# Patient Record
Sex: FEMALE | Race: WHITE | NOT HISPANIC OR LATINO | Employment: FULL TIME
[De-identification: names, ages, dates, MRNs, and addresses within clinical notes are randomized per-mention and may not be internally consistent; named-entity substitution may affect disease eponyms.]

---

## 2017-06-24 ENCOUNTER — HEALTH MAINTENANCE LETTER (OUTPATIENT)
Age: 57
End: 2017-06-24

## 2017-11-09 ENCOUNTER — TRANSFERRED RECORDS (OUTPATIENT)
Dept: HEALTH INFORMATION MANAGEMENT | Facility: CLINIC | Age: 57
End: 2017-11-09

## 2017-12-06 ENCOUNTER — OFFICE VISIT (OUTPATIENT)
Dept: OPHTHALMOLOGY | Facility: CLINIC | Age: 57
End: 2017-12-06
Attending: OPHTHALMOLOGY
Payer: COMMERCIAL

## 2017-12-06 DIAGNOSIS — H25.10 NUCLEAR SENILE CATARACT, UNSPECIFIED LATERALITY: Primary | ICD-10-CM

## 2017-12-06 DIAGNOSIS — H04.123 DRY EYES: ICD-10-CM

## 2017-12-06 DIAGNOSIS — H25.13 AGE-RELATED NUCLEAR CATARACT OF BOTH EYES: ICD-10-CM

## 2017-12-06 PROCEDURE — 76519 ECHO EXAM OF EYE: CPT | Mod: ZF | Performed by: OPHTHALMOLOGY

## 2017-12-06 PROCEDURE — 99215 OFFICE O/P EST HI 40 MIN: CPT | Mod: ZF

## 2017-12-06 PROCEDURE — 92025 CPTRIZED CORNEAL TOPOGRAPHY: CPT | Mod: ZF | Performed by: OPHTHALMOLOGY

## 2017-12-06 ASSESSMENT — REFRACTION_WEARINGRX
OS_CYLINDER: +0.75
OS_SPHERE: -6.25
OD_SPHERE: -6.25
OD_CYLINDER: +1.50
OD_ADD: +1.75
OS_AXIS: 079
SPECS_TYPE: PAL
OD_AXIS: 108
OS_ADD: +1.75

## 2017-12-06 ASSESSMENT — EXTERNAL EXAM - LEFT EYE: OS_EXAM: NORMAL

## 2017-12-06 ASSESSMENT — TONOMETRY
OS_IOP_MMHG: 17
IOP_METHOD: TONOPEN
OD_IOP_MMHG: 17

## 2017-12-06 ASSESSMENT — VISUAL ACUITY
OD_CC: 20/70
METHOD: SNELLEN - LINEAR
CORRECTION_TYPE: GLASSES, CONTACTS
OD_PH_CC: 20/60-1
OD_CC+: +1
OS_CC+: -3
OS_CC: 20/25

## 2017-12-06 ASSESSMENT — EXTERNAL EXAM - RIGHT EYE: OD_EXAM: NORMAL

## 2017-12-06 ASSESSMENT — CUP TO DISC RATIO
OS_RATIO: 0.4
OD_RATIO: 0.4

## 2017-12-06 ASSESSMENT — CONF VISUAL FIELD
OS_NORMAL: 1
OD_NORMAL: 1

## 2017-12-06 NOTE — LETTER
12/6/2017       RE: Emily Mcdermott  [address] [facility] [address]     Dear Colleague,    Thank you for referring your patient, Emily Mcdermott, to the EYE CLINIC at [facility]. Please see a copy of my visit note below.    Emily Mcdermott  is a 57 year old year old female  who presents with visually significant cataract OU affecting activities of daily living.      Myopic both eyes, reports vision is distorted OU. Patient reports trouble with depth perception with current situation, and gets nauseous at times. She is wearing CL in OD and then putting corrective glasses on top of it (using double the prescribed MRx in OD!!)    After a detailed discussion, the patient has agreed to proceed with cataract surgery extraction with intraocular lens implant  right eye    Risks and benefits of cataract surgery were discussed with the patient, including the risk of pain, bleeding, infection, inflammation, elevated eye pressure, the need for additional surgery, loss of vision, loss of the eye, and the possible need to wear glasses postoperatively.      Anesthesia: topical/MAC    Trypan blue: Yes    IFIS solution: No    Malyugin ring: No    Refractive target: emmetropia. Does not want monovision    IOL type: 1 piece    Toric IOL:  Patient wants to think about it    Additional considerations:      No guttata/PXE, not on blood thinner or flomax  7 mm dilation both eyes  No trauma or steroids  No claustrophobia    Will have anisometropia after CATARACT EXTRACTION OD  Discussed option of CL vs CATARACT EXTRACTION OS postop OD    Patient was wearing CL OD today. Repeat topography and IOL calc prior to surgery (after patient has been out of contact lenses for 7-10 days).    RETURN TO CLINIC in 1 month for K tpopgraphy and IOL calc     ~~~~~~~~~~~~~~~~~~~~~~~~~~~~~~~~~~~~~~~~~~~~~~~~~~~~~~~~~~~~~~~~      Again, thank you for allowing me to participate in the care of your  patient.      Sincerely,    Jay Knight MD

## 2017-12-06 NOTE — PROGRESS NOTES
Emily Mcdermott  is a 57 year old year old female  who presents with visually significant cataract OU affecting activities of daily living.      Myopic both eyes, reports vision is distorted OU. Patient reports trouble with depth perception with current situation, and gets nauseous at times. She is wearing CL in OD and then putting corrective glasses on top of it (using double the prescribed MRx in OD!!)    After a detailed discussion, the patient has agreed to proceed with cataract surgery extraction with intraocular lens implant  right eye    Risks and benefits of cataract surgery were discussed with the patient, including the risk of pain, bleeding, infection, inflammation, elevated eye pressure, the need for additional surgery, loss of vision, loss of the eye, and the possible need to wear glasses postoperatively.      Anesthesia: topical/MAC    Trypan blue: Yes    IFIS solution: No    Malyugin ring: No    Refractive target: emmetropia. Does not want monovision    IOL type: 1 piece    Toric IOL:  Patient wants to think about it    Additional considerations:      No guttata/PXE, not on blood thinner or flomax  7 mm dilation both eyes  No trauma or steroids  No claustrophobia    Will have anisometropia after CATARACT EXTRACTION OD  Discussed option of CL vs CATARACT EXTRACTION OS postop OD    Patient was wearing CL OD today. Repeat topography and IOL calc prior to surgery (after patient has been out of contact lenses for 7-10 days).    RETURN TO CLINIC in 1 month for K tpopgraphy and IOL calc     REMI Magallon  Cornea fellow      ~~~~~~~~~~~~~~~~~~~~~~~~~~~~~~~~~~~~~~~~~~~~~~~~~~~~~~~~~~~~~~~~    Complete documentation of historical and exam elements from today's encounter can be found in the full encounter summary report (not reduplicated in this progress note). I personally obtained the chief complaint(s) and history of present illness.  I confirmed and edited as necessary the review of systems, past  medical/surgical history, family history, social history, and examination findings as documented by others; and I examined the patient myself. I personally reviewed the relevant tests, images, and reports as documented above. I formulated and edited as necessary the assessment and plan and discussed the findings and management plan with the patient and family.    I personally viewed the [imaging] and I agree with the interpretation as documented by the resident/fellow and edited by me as appropriate.    Jay Knight MD        I personally spent great than 40min with the patient, of which >50% of the time was spent face to face with the patient, counseling and coordinating care with the patient. We discussed the complexity of her diagnosis, the need for further information prior to proceeding with yet another surgery, and the unknown prognosis for the patient at this time.    Jay Knight MD

## 2017-12-06 NOTE — NURSING NOTE
Chief Complaints and History of Present Illnesses   Patient presents with     Cataract Evaluation     referred by Dr. Sheriff     HPI    Affected eye(s):  Right   Symptoms:     Floaters (Comment: infrequently noted)   No flashes   Glare (Comment: with headlights at night)   Photophobia (Comment: with headlights at night)      Duration:  2 weeks   Frequency:  Constant       Do you have eye pain now?:  No      Comments:  Pt here for cataract eval - referred by Dr. Sheriff at [facility]  Pt states vision is horrible, much worse in the right eye than the left  Pt wears CL in the right eye and glasses    Pt is diabetic but isn't currently checking BS daily  Pt unsure what most recent A1c measurement was    No drops    Nicole Mixon COA 1:10 PM December 6, 2017

## 2017-12-06 NOTE — MR AVS SNAPSHOT
After Visit Summary   12/6/2017    Emily Mcdermott    MRN: [id number]           Patient Information     Date Of Birth          1960        Visit Information        Provider Department      12/6/2017 12:45 PM Jay Knight MD Eye Clinic        Today's Diagnoses     Nuclear senile cataract, unspecified laterality - Both Eyes    -  1    Age-related nuclear cataract of both eyes - Both Eyes        Dry eyes - Both Eyes           Follow-ups after your visit        Your next 10 appointments already scheduled     Jan 17, 2018  9:30 AM CST   [facility]   Eye Clinic ([facility])    [address]  [address]   [address] [address]  [facility] [address]   [phone]            Jan 24, 2018  9:30 AM CST   Post-Op with Jay Knight MD   Eye Clinic ([facility])    [address]  [address]   [address]  [place] [address]   [phone]            Jan 31, 2018  9:30 AM CST   Post-Op with Jay Knight MD   Eye Clinic ([facility])    [address]  [address]  [address]  [place] [address]   [phone]            Feb 21, 2018  9:30 AM CST   Post-Op with Jay Knight MD   Eye Clinic ([facility])    [address]  [address]   [address]  [place] [facility] [address]   [phone]              Who to contact     Please call your clinic at [phone] to:    Ask questions about your health    Make or cancel appointments    Discuss your medicines    Learn about your test results    Speak to your doctor   If you have compliments or concerns about an experience at your clinic, or if you wish to file a complaint, please contact [facility] Patient Relations at [phone] or email us at [email or web address]         Additional Information About Your Visit        MyChart Information     BlackBamboozStudio is an electronic gateway that  provides easy, online access to your medical records. With [employer], you can request a clinic appointment, read your test results, renew a prescription or communicate with your care team.     To sign up for [employer] visit the website at [email or web address]   You will be asked to enter the access code listed below, as well as some personal information. Please follow the directions to create your username and password.     Your access code is: [email or web address]  Expires: 2018  6:30 AM     Your access code will  in 90 days. If you need help or a new code, please contact your [facility] or call [phone] for assistance.        Care EveryWhere ID     This is your Care EveryWhere ID. This could be used by other organizations to access your [place] medical records  [id number]         Blood Pressure from Last 3 Encounters:   08 100/64   06 124/70   05 112/60    Weight from Last 3 Encounters:   08 84.1 kg (185 lb 6 oz)   06 84.9 kg (187 lb 4 oz)   05 85.5 kg (188 lb 8 oz)              We Performed the Following     Corneal Topography OU (both eyes)     IOL Biometry w/ IOL calc OU (both eye)     Lydia-Operative Worksheet (Ant Seg)        Primary Care Provider Office Phone # Fax #    Angelita Rody Saleh [phone]       [facility] [address]        Equal Access to Services     JOHN MIDDLETON AH: Hadii aad ku hadasho Soomaali, waaxda luqadaha, qaybta kaalmada adeegyada, waxevelia marcial. So [facility] [phone].    ATENCIÓN: Si ledyla lenard, tiene a willis disposición servicios gratuitos de asistencia lingüística. Llame al [phone].    We comply with applicable federal civil rights laws and [place] laws. We do not discriminate on the basis of race, color, national origin, age, disability, sex, sexual orientation, or gender identity.            Thank you!     Thank you for choosing  EYE CLINIC  for your care. Our goal is always to provide you with excellent care. Hearing back from our patients is one way we can continue to improve our services. Please take a few minutes to complete the written survey that you may receive in the mail after your visit with us. Thank you!             Your Updated Medication List - Protect others around you: Learn how to safely use, store and throw away your medicines at www.disposemymeds.org.          This list is accurate as of: 12/6/17  3:43 PM.  Always use your most recent med list.                   Brand Name Dispense Instructions for use Diagnosis    MAGNESIUM PO      Take 1 Dose by mouth daily        NASACORT AQ 55 MCG/ACT Inhaler   Generic drug:  triamcinolone     1    2 puff each nostril QD (Once per day)    Allergic rhinitis, cause unspecified       OMEGA 3 PO      Take 9 mg by mouth daily        PROAIR  (90 BASE) MCG/ACT Inhaler   Generic drug:  albuterol     1    INHALE 1 TO 2 PUFFS EVERY 4 TO 6 HOURS AS NEEDED    Mild intermittent asthma       PULMICORT FLEXHALER 180 MCG/ACT inhaler   Generic drug:  budesonide     1    in inhalation bid    Mild intermittent asthma       VITAMIN B-12 PO      Take 1 tablet by mouth daily        VITAMIN D (CHOLECALCIFEROL) PO      Take 1 tablet by mouth daily

## 2018-01-17 ENCOUNTER — ALLIED HEALTH/NURSE VISIT (OUTPATIENT)
Dept: OPHTHALMOLOGY | Facility: CLINIC | Age: 58
End: 2018-01-17
Attending: OPHTHALMOLOGY
Payer: COMMERCIAL

## 2018-01-17 DIAGNOSIS — H26.9 NUCLEAR CATARACT, NONSENILE: Primary | ICD-10-CM

## 2018-01-17 PROCEDURE — G0463 HOSPITAL OUTPT CLINIC VISIT: HCPCS | Mod: ZF

## 2018-01-17 ASSESSMENT — REFRACTION_WEARINGRX
OD_SPHERE: -6.25
OS_AXIS: 079
OS_CYLINDER: +0.75
OS_SPHERE: -6.25
SPECS_TYPE: PAL
OD_ADD: +1.75
OD_CYLINDER: +1.50
OS_ADD: +1.75
OD_AXIS: 108

## 2018-01-17 ASSESSMENT — VISUAL ACUITY
OS_CC: 20/30
CORRECTION_TYPE: GLASSES
OD_CC: 20/500
OD_PH_CC: 20/125
METHOD: SNELLEN - LINEAR

## 2018-01-17 ASSESSMENT — REFRACTION_MANIFEST
OD_CYLINDER: +1.25
OS_CYLINDER: +0.50
OS_AXIS: 080
OS_SPHERE: -6.25
OD_SPHERE: -8.25
OD_AXIS: 120

## 2018-01-17 NOTE — NURSING NOTE
Chief Complaints and History of Present Illnesses   Patient presents with     Follow Up For     calcs     HPI    Symptoms:              Comments:  Here for repeat brittney and calcs  Rosangela Eng COT 9:45 AM January 17, 2018

## 2018-01-17 NOTE — MR AVS SNAPSHOT
After Visit Summary   1/17/2018    Emily Mcdermott    MRN: [id number]           Patient Information     Date Of Birth          1960        Visit Information        Provider Department      1/17/2018 9:30 AM [facility] EYE [facility]        Today's Diagnoses     Nuclear cataract, nonsenile    -  1       Follow-ups after your visit        Your next 10 appointments already scheduled     Feb 21, 2018  9:30 AM CST   Post-Op with Jay Knight MD   Eye Clinic ([facility])    [address] [address]   [phone]            Mar 14, 2018 10:15 AM CDT   Post-Op with Jay Knight MD   Eye Clinic ([facility])    [address] [address]   [phone]            Mar 23, 2018 12:45 PM CDT   Post-Op with Jay Knight MD   Eye Clinic ([facility])    [address] [address]   [phone]            Apr 11, 2018 10:15 AM CDT   Post-Op with Jay Knight MD   Eye Clinic ([facility])    [address] [address]   [phone]              Who to contact     Please call your clinic at [phone] to:    Ask questions about your health    Make or cancel appointments    Discuss your medicines    Learn about your test results    Speak to your doctor            Additional Information About Your Visit        Arclight Media Technologyhart Information     Studiekring is an electronic gateway that provides easy, online access to your medical records. With Studiekring, you can request a clinic appointment, read your test results, renew a prescription or communicate with your care team.     To sign up for Studiekring visit the website at [email or web address]   You will be asked to enter the access code listed below, as well as some  personal information. Please follow the directions to create your username and password.     Your access code is: [email or web address]  Expires: 2018  6:30 AM     Your access code will  in 90 days. If you need help or a new code, please contact your [facility] or call [phone] for assistance.        Care EveryWhere ID     This is your Care EveryWhere ID. This could be used by other organizations to access your [place] medical records  [id number]         Blood Pressure from Last 3 Encounters:   18 113/80   08 100/64   06 124/70    Weight from Last 3 Encounters:   18 62.2 kg (137 lb 1.6 oz)   08 84.1 kg (185 lb 6 oz)   06 84.9 kg (187 lb 4 oz)              We Performed the Following     Corneal Topography OU (both eyes)     IOL Biometry w/ IOL calc OU (both eye)        Primary Care Provider Office Phone # Fax #    Angelita Rody Saleh [phone]       [address]  [facility] [address]        Equal Access to Services     JOHN MIDDLETON AH: Hadii mark popeo Somarkell, waaxda luqadaha, qaybta kaalmada adeegyada, emery marcial. So [facility] [phone].    ATENCIÓN: Si habla español, tiene a willis disposición servicios gratuitos de asistencia lingüística. Ll[facility] [phone].    We comply with applicable federal civil rights laws and [place] laws. We do not discriminate on the basis of race, color, national origin, age, disability, sex, sexual orientation, or gender identity.            Thank you!     Thank you for choosing EYE CLINIC  for your care. Our goal is always to provide you with excellent care. Hearing back from our patients is one way we can continue to improve our services. Please take a few minutes to complete the written survey that you may receive in the mail after your visit with us. Thank you!             Your Updated Medication List - Protect others around you: Learn  how to safely use, store and throw away your medicines at www.disposemymeds.org.          This list is accurate as of 1/17/18 11:59 PM.  Always use your most recent med list.                   Brand Name Dispense Instructions for use Diagnosis    MAGNESIUM PO      Take 1 Dose by mouth daily        NASACORT AQ 55 MCG/ACT Inhaler   Generic drug:  triamcinolone     1    2 puff each nostril QD (Once per day)    Allergic rhinitis, cause unspecified       OMEGA 3 PO      Take 9 mg by mouth daily        PROAIR  (90 BASE) MCG/ACT Inhaler   Generic drug:  albuterol     1    INHALE 1 TO 2 PUFFS EVERY 4 TO 6 HOURS AS NEEDED    Mild intermittent asthma       PULMICORT FLEXHALER 180 MCG/ACT inhaler   Generic drug:  budesonide     1    in inhalation bid    Mild intermittent asthma       VITAMIN B-12 PO      Take 1 tablet by mouth daily        VITAMIN D (CHOLECALCIFEROL) PO      Take 1 tablet by mouth daily

## 2018-01-19 ENCOUNTER — OFFICE VISIT (OUTPATIENT)
Dept: FAMILY MEDICINE | Facility: CLINIC | Age: 58
End: 2018-01-19
Payer: COMMERCIAL

## 2018-01-19 VITALS
HEIGHT: 66 IN | RESPIRATION RATE: 16 BRPM | OXYGEN SATURATION: 100 % | DIASTOLIC BLOOD PRESSURE: 80 MMHG | WEIGHT: 137.1 LBS | BODY MASS INDEX: 22.03 KG/M2 | TEMPERATURE: 97.7 F | SYSTOLIC BLOOD PRESSURE: 113 MMHG | HEART RATE: 90 BPM

## 2018-01-19 DIAGNOSIS — H26.9 CATARACT OF RIGHT EYE, UNSPECIFIED CATARACT TYPE: ICD-10-CM

## 2018-01-19 DIAGNOSIS — Z01.818 PRE-OP EXAM: Primary | ICD-10-CM

## 2018-01-19 ASSESSMENT — ANXIETY QUESTIONNAIRES
5. BEING SO RESTLESS THAT IT IS HARD TO SIT STILL: NOT AT ALL
6. BECOMING EASILY ANNOYED OR IRRITABLE: NOT AT ALL
GAD7 TOTAL SCORE: 0
2. NOT BEING ABLE TO STOP OR CONTROL WORRYING: NOT AT ALL
7. FEELING AFRAID AS IF SOMETHING AWFUL MIGHT HAPPEN: NOT AT ALL
1. FEELING NERVOUS, ANXIOUS, OR ON EDGE: NOT AT ALL
3. WORRYING TOO MUCH ABOUT DIFFERENT THINGS: NOT AT ALL
IF YOU CHECKED OFF ANY PROBLEMS ON THIS QUESTIONNAIRE, HOW DIFFICULT HAVE THESE PROBLEMS MADE IT FOR YOU TO DO YOUR WORK, TAKE CARE OF THINGS AT HOME, OR GET ALONG WITH OTHER PEOPLE: NOT DIFFICULT AT ALL

## 2018-01-19 ASSESSMENT — PATIENT HEALTH QUESTIONNAIRE - PHQ9
5. POOR APPETITE OR OVEREATING: NOT AT ALL
SUM OF ALL RESPONSES TO PHQ QUESTIONS 1-9: 0

## 2018-01-19 ASSESSMENT — PAIN SCALES - GENERAL: PAINLEVEL: NO PAIN (0)

## 2018-01-19 NOTE — MR AVS SNAPSHOT
After Visit Summary   1/19/2018    Emily Mcdermott    MRN: [id number]           Patient Information     Date Of Birth          1960        Visit Information        Provider Department      1/19/2018 10:00 AM Carlyn Huff NP [facility]        Today's Diagnoses     Pre-op exam    -  1    Cataract of right eye, unspecified cataract type           Follow-ups after your visit        Your next 10 appointments already scheduled     Jan 24, 2018  9:30 AM CST   Post-Op with Jay Knight MD   Eye Clinic ([facility])    [address]  [address] [facility] [address] [address]  [address] [address]  [facility] [facility] [address]   [phone]            Jan 31, 2018  9:30 AM CST   Post-Op with Jay Knight MD   Eye Clinic ([facility])    [address]  [address]  [address] [address]  [facility] [address]   [phone]            Feb 21, 2018  9:30 AM CST   Post-Op with Jay nKight MD   Eye Clinic ([facility])    [address]  [address] [facility] [address] [address]  [address] [address]  [facility] [facility] [address]   [phone]              Who to contact     Please call your clinic at [phone] to:    Ask questions about your health    Make or cancel appointments    Discuss your medicines    Learn about your test results    Speak to your doctor   If you have compliments or concerns about an experience at your clinic, or if you wish to file a complaint, please contact [facility] Patient Relations at [phone] or email us at [email or web address]         Additional Information About Your Visit        MyChart Information     RingMDt is an electronic gateway that provides easy, online access to your medical records. With De[employer]k Systems, you can request a clinic appointment, read your test results, renew a prescription or communicate with your care team.     To sign up for RingMDt visit the website at [email or web address]   You will be  "asked to enter the access code listed below, as well as some personal information. Please follow the directions to create your username and password.     Your access code is: [email or web address]  Expires: 2018  6:30 AM     Your access code will  in 90 days. If you need help or a new code, please contact your [facility] or call [phone] for assistance.        Care EveryWhere ID     This is your Care EveryWhere ID. This could be used by other organizations to access your [place] medical records  [id number]        Your Vitals Were     Pulse Temperature Respirations Height Pulse Oximetry Breastfeeding?    90 97.7  F (36.5  C) (Oral) 16 5' 5.7\" (166.9 cm) 100% No    BMI (Body Mass Index)                   22.33 kg/m2            Blood Pressure from Last 3 Encounters:   18 113/80   08 100/64   06 124/70    Weight from Last 3 Encounters:   18 137 lb 1.6 oz (62.2 kg)   08 185 lb 6 oz (84.1 kg)   06 187 lb 4 oz (84.9 kg)              Today, you had the following     No orders found for display       Primary Care Provider Office Phone # Fax #    Angelita Rody Saleh [phone]       [address] [facility] [address]        Equal Access to Services     [facility]: Hadii aad ku hadasho Somarkell, waaxda luqadaha, qaybta kaalmada adeegyada, emery chu . So [facility] [phone].    ATENCIÓN: Si habla español, tiene a willis disposición servicios gratuitos de asistencia lingüística. Llame al [phone].    We comply with applicable federal civil rights laws and [place] laws. We do not discriminate on the basis of race, color, national origin, age, disability, sex, sexual orientation, or gender identity.            Thank you!     Thank you for choosing [facility]  for your care. Our goal is always to provide you with excellent care. Hearing back from our patients is one way we can " continue to improve our services. Please take a few minutes to complete the written survey that you may receive in the mail after your visit with us. Thank you!             Your Updated Medication List - Protect others around you: Learn how to safely use, store and throw away your medicines at www.disposemymeds.org.          This list is accurate as of: 1/19/18 10:39 AM.  Always use your most recent med list.                   Brand Name Dispense Instructions for use Diagnosis    MAGNESIUM PO      Take 1 Dose by mouth daily        NASACORT AQ 55 MCG/ACT Inhaler   Generic drug:  triamcinolone     1    2 puff each nostril QD (Once per day)    Allergic rhinitis, cause unspecified       OMEGA 3 PO      Take 9 mg by mouth daily        PROAIR  (90 BASE) MCG/ACT Inhaler   Generic drug:  albuterol     1    INHALE 1 TO 2 PUFFS EVERY 4 TO 6 HOURS AS NEEDED    Mild intermittent asthma       PULMICORT FLEXHALER 180 MCG/ACT inhaler   Generic drug:  budesonide     1    in inhalation bid    Mild intermittent asthma       VITAMIN B-12 PO      Take 1 tablet by mouth daily        VITAMIN D (CHOLECALCIFEROL) PO      Take 1 tablet by mouth daily

## 2018-01-19 NOTE — NURSING NOTE
"57 year old  Chief Complaint   Patient presents with     Pre-Op Exam     Right eye cataract extraction with intraocular lens placement. DOS: 01/23/2018, With Dr.Joshou Knight, @ [facility] eye [place]. Local anesthesia. Fax #:[phone].       Blood pressure 113/80, pulse 90, temperature 97.7  F (36.5  C), temperature source Oral, resp. rate 16, height 5' 5.7\" (166.9 cm), weight 137 lb 1.6 oz (62.2 kg), SpO2 100 %, not currently breastfeeding. Body mass index is 22.33 kg/(m^2).  BP completed using cuff size: regular    Patient Active Problem List   Diagnosis     Diabetes mellitus, type 2 (H)     Mild intermittent asthma     Allergic rhinitis     TYPE 2 DIABETES, HBA1C GOAL < 7%     CARDIOVASCULAR SCREENING; LDL GOAL LESS THAN 100       Wt Readings from Last 2 Encounters:   01/19/18 137 lb 1.6 oz (62.2 kg)   03/19/08 185 lb 6 oz (84.1 kg)     BP Readings from Last 3 Encounters:   01/19/18 113/80   03/19/08 100/64   06/09/06 124/70       Current Outpatient Prescriptions   Medication     Cyanocobalamin (VITAMIN B-12 PO)     Omega-3 Fatty Acids (OMEGA 3 PO)     MAGNESIUM PO     VITAMIN D, CHOLECALCIFEROL, PO     PULMICORT FLEXHALER 180 MCG/ACT IN INHA     NASACORT AQ 55 MCG/ACT NA AERS     PROAIR  (90 BASE) MCG/ACT IN AERS     No current facility-administered medications for this visit.        Social History   Substance Use Topics     Smoking status: Former Smoker     Quit date: 1982     Smokeless tobacco: Former User     Quit date: 1982     Alcohol use Not on file       Health Maintenance Due   Topic Date Due     FOOT EXAM Q1 YEAR  08/05/1961     MICROALBUMIN Q1 YEAR  08/05/1961     PNEUMOVAX 1X HI RISK PATIENT < 65 (NO IB MSG)  08/05/1962     TETANUS IMMUNIZATION (SYSTEM ASSIGNED)  08/05/1978     HEPATITIS C SCREENING  08/05/1978     PAP SCREENING Q3 YR (SYSTEM ASSIGNED)  08/05/1981     CREATININE Q1 YEAR  07/09/2006     A1C Q3 MO  01/15/2008     LIPID MONITORING Q3 MO  08/13/2008     TSH W/ FREE T4 " REFLEX Q2 YEAR  10/15/2009     MAMMO SCREEN Q2 YR (SYSTEM ASSIGNED)  08/05/2010     COLON CANCER SCREEN (SYSTEM ASSIGNED)  08/05/2010     ADVANCE DIRECTIVE PLANNING Q5 YRS  08/05/2015     INFLUENZA VACCINE (SYSTEM ASSIGNED)  09/01/2017       No results found for: PAP    PHQ-2 ( [facility]) 1/19/2018   Q1: Little interest or pleasure in doing things 0   Q2: Feeling down, depressed or hopeless 0   PHQ-2 Score 0       PHQ-9 SCORE 1/19/2018   Total Score 0       SENIA-7 SCORE 1/19/2018   Total Score 0       No flowsheet data found.    Phylicia Isabel MA  January 19, 2018 9:47 AM

## 2018-01-19 NOTE — PROGRESS NOTES
[facility]  [address] [place] [id number]  Phone: [phone]  Fax: [phone]    1/19/2018    Adult PRE-OP Evaluation:    Emily NEFF Sharron, 1960 presents for pre-operative evaluation and assessment as requested by Dr. Jay Knight, prior to undergoing surgery/procedure for treatment of  Right eye cataract.      History of presenting illness: Right eye cataract extraction with intraocular lens placement.    Proposed procedure: Right eye cataract extraction and intraocular lens placement. .    Date of Surgery/ Procedure: 1/23/18.  Hospital/Surgical Facility: [facility]     Primary Physician: Carlyn Huff, DNP, APRN, CNP  Type of Anesthesia Anticipated: Local  History of anesthesia complications: NONE  History of  abnormal bleeding: NONE  History of blood transfusions: NO  Patient has a Health Care Directive or Living Will:  NO    Preoperative Screening Questions   1- NO - Do you ever have any pain or discomfort in your chest?  2- NO - Have you ever had a severe pain across the front of your chest lasting for half an hour or more?  3- YES - Do you have swelling in your feet or ankles at times? With increased activity, standing, walking, notices some lower extremity swelling, mild.   4- NO - Are you troubled by shortness of breath when: walking on the level/ up a slight hill/ at night? Longstanding history of asthma. Since becoming vegan six years ago, has no longer required inhalers or medications to manage asthma and allergies, essentially resolved.   5- NO - Does your chest ever sound wheezy or whistling?  6- NO - Do you currently have a cold, bronchitis or other respiratory infection?  7- NO - Have you had a cold, bronchitis or other respiratory infection within the last 2 weeks?  8- NO - Do you usually have a cough?  9- NO - Do you sometimes get pains in the calves of your legs when you walk?  10-NO - Do you or anyone in your family have previous history of blood  clots?  11-NO - Do you or does anyone in your family have serious bleeding problem such as prolonged bleeding following surgeries or cuts?   12-YES - Have you ever had problems with anemia or been told to take iron pills? 20+ years ago, resolved.  13-YES - Have you had any abnormal blood loss such as black, tarry or bloody stools, or abnormal vaginal bleeding? History of vaginal bleeding, had a hysterectomy, resolved.  14-NO - Have you or any of your relatives ever had problems with anesthesia?  15-NO - Do you snore or stop breathing at night?  16-NO - Do you have any prosthetic heart valves or joints?  17-NO - Is there any chance that you may be pregnant?     Audit C Alcohol Screening Tool  AUDIT   Questions 0 1 2 3 4 Score   1. How often do you have a drink  containing alcohol? Never Monthly or less 2 to 4  times a  month 2 to 3  times a  week 4 or more  times a  week 2   2. How many drinks containing alcohol  do you have on a typical day when you are drinking? 1 or 2 3 or 4 5 or 6 7 to 9 10 or more 0   3. How often do you have more than five  or more drinks on one occasion? Never Less  than  monthly Monthly Weekly Daily or  almost  daily 0     Total: 2  Scoring: A score of 4 for adult men or 3 for adult women is an indication of hazardous drinking (risk for physical or physiological harm). A score of 5 or more for adult men or 4 or more for adult women is an indication of an alcohol use disorder.    STOP-Bang Questionnaire: Yes to 1 question. Low risk of LANNY: Yes to 0-2 questions  Neck circumference (cm): 35    Patient Active Problem List   Diagnosis     Diabetes mellitus, type 2 (H)     Mild intermittent asthma     Allergic rhinitis     TYPE 2 DIABETES, HBA1C GOAL < 7%     CARDIOVASCULAR SCREENING; LDL GOAL LESS THAN 100       Current Outpatient Prescriptions on File Prior to Visit:  Cyanocobalamin (VITAMIN B-12 PO) Take 1 tablet by mouth daily   Omega-3 Fatty Acids (OMEGA 3 PO) Take 9 mg by mouth daily    MAGNESIUM PO Take 1 Dose by mouth daily   VITAMIN D, CHOLECALCIFEROL, PO Take 1 tablet by mouth daily   PULMICORT FLEXHALER 180 MCG/ACT IN INHA in inhalation bid (Patient not taking: No sig reported)   NASACORT AQ 55 MCG/ACT NA AERS 2 puff each nostril QD (Once per day) (Patient not taking: No sig reported)   PROAIR  (90 BASE) MCG/ACT IN AERS INHALE 1 TO 2 PUFFS EVERY 4 TO 6 HOURS AS NEEDED (Patient not taking: No sig reported)     No current facility-administered medications on file prior to visit.     OTC products: no recent use of OTC ASA, NSAIDS or Steroids and herbals and vitamins - as above    Allergies   Allergen Reactions     Penicillins      Latex Allergy: NO    Social History     Social History     Marital status:      Spouse name: N/A     Number of children: N/A     Years of education: N/A     Social History Main Topics     Smoking status: Former Smoker     Quit date: 1982     Smokeless tobacco: Former User     Quit date: 1982     Alcohol use None     Drug use: None     Sexual activity: Not Asked     Other Topics Concern     None     Social History Narrative       REVIEW OF SYSTEMS:     REVIEW OF SYSTEMS  General: negative for, fever, chills, unplanned weight loss  Skin: negative for, rash, lesions, moles  Eyes: negative for, eye pain, redness, tearing, itching  ENT: negative for, hearing loss, discharge, ear infections, sinus congestion, postnasal drainage, sore throat  Resp: No shortness of breath, dyspnea on exertion, cough, or hemoptysis  CV: negative for, palpitations, tachycardia, irregular heart beat, chest pain, exertional chest pain or pressure, dyspnea on exertion and syncope or near-syncope  GI: negative for, nausea, vomiting, heartburn, reflux, abdominal pain, constipation and diarrhea  : negative for, dysuria, frequency, urgency, hesitancy, hematuria and vaginal discharge  Musculoskeletal: negative for, back pain, neck pain, arthritis, joint pain, joint swelling, joint  "stiffness and muscular weakness  Neurologic: negative for, local weakness, numbness or tingling of hands and numbness or tingling of feet  Hematologic: negative for and fever    EXAM:   Patient Vitals for the past 24 hrs:   BP Temp Temp src Pulse Resp SpO2 Height Weight   01/19/18 0941 113/80 97.7  F (36.5  C) Oral 90 16 100 % 5' 5.7\" (166.9 cm) 137 lb 1.6 oz (62.2 kg)     Body mass index is 22.33 kg/(m^2).    Constitutional: appears to be in no acute distress, comfortable, pleasant.   Eyes: anicteric, conjunctiva clear without erythema, red reflex present bilaterally.   Ears, Nose and Throat: tympanic membranes gray with LR,  nose without nasal discharge. OP: no erythema to posterior pharynx, negative post nasal drainage, tonsils +1 no erythema or exudate.  Neck: supple, thyroid palpable,not enlarged, no nodules   Cardiovascular: regular rate and rhythm, normal S1 and S2, no murmurs, rubs or gallops, peripheral pulses full and symmetric; negative peripheral edema   Respiratory: Air entry throughout. Breathing pattern unlabored without the use of accessory muscles. Clear to auscultation A and P, no wheezes or crackles, normal breath sounds.    Gastrointestinal: flat, soft. Positive bowel sounds x4, nontender, no masses.   Musculoskeletal: full range of motion, no edema.   Skin: pink, turgor smooth and elastic. Negative for lesions or dryness.  Neurological: normal gait, no tremor.   Psychological: appropriate mood and affect.   Lymphatic: no cervical, axillary, supraclavicular, or infraclavicular lymphadenopathy.    DIAGNOSTICS:      No labs or EKG required for low risk surgery (cataract, skin procedure, breast biopsy, etc)    RISK ASSESSMENT:     Cardiovascular Risk:  -Patient is able to perform ADL's without assistance without chest pain.  -The patient does not have chest pain with exertion.  -Patient does not have a history of congestive heart failure.    -The patient does not have a history of stroke and does " not have a history of valvular disease.    Pulmonary Risk:  -In terms of risk factors for pulmonary complication, the patient has no risk factors. History of asthma, has ultimately resolved since becoming vegan six years ago. No longer on inhalers or medications to manage.    STOP-Bang Questionnaire: Yes to 1 question. Low risk of LANNY: Yes to 0-2 questions  Neck circumference (cm): 35    Perioperative Complications:  -The patient does not a history of bleeding or clotting problems in the past.    -The patient has not had complications from surgeries.    -The patient does not have a family history of any anesthesia or surgical complications.      IMPRESSION:   Reason for surgery/procedure: Cataract extraction, right eye    The proposed surgical procedure is considered LOW risk.    For above listed surgery and anesthesia:   Patient is at low risk for surgery/procedure and perioperative/procedure complications.    RECOMMENDATIONS:     Labs:  None    Fasting:  NPO for 12 hours prior to surgery    Preop Plan:  --Approval given to proceed with proposed procedure, without further diagnostic evaluation    Medications:  Patient should take their regular medications the morning of surgery unless otherwise instructed.    Hold aspirin 7 days prior to surgery.  Hold ibuprofen for 5 days prior.      Carlyn Huff NP    Please contact our office if there are any further questions or information required about this patient.

## 2018-01-23 ENCOUNTER — TRANSFERRED RECORDS (OUTPATIENT)
Dept: HEALTH INFORMATION MANAGEMENT | Facility: CLINIC | Age: 58
End: 2018-01-23

## 2018-01-23 ASSESSMENT — ANXIETY QUESTIONNAIRES: GAD7 TOTAL SCORE: 0

## 2018-01-24 ENCOUNTER — OFFICE VISIT (OUTPATIENT)
Dept: OPHTHALMOLOGY | Facility: CLINIC | Age: 58
End: 2018-01-24
Attending: OPHTHALMOLOGY
Payer: COMMERCIAL

## 2018-01-24 DIAGNOSIS — H25.12 AGE-RELATED NUCLEAR CATARACT OF LEFT EYE: ICD-10-CM

## 2018-01-24 DIAGNOSIS — H04.123 DRY EYES: Primary | ICD-10-CM

## 2018-01-24 DIAGNOSIS — Z96.1 PSEUDOPHAKIA: ICD-10-CM

## 2018-01-24 PROCEDURE — G0463 HOSPITAL OUTPT CLINIC VISIT: HCPCS | Mod: ZF

## 2018-01-24 RX ORDER — OFLOXACIN 3 MG/ML
1 SOLUTION/ DROPS OPHTHALMIC 4 TIMES DAILY
COMMUNITY
Start: 2018-01-23 | End: 2018-02-21

## 2018-01-24 RX ORDER — KETOROLAC TROMETHAMINE 5 MG/ML
1 SOLUTION OPHTHALMIC 4 TIMES DAILY
COMMUNITY
Start: 2018-01-23 | End: 2018-02-21

## 2018-01-24 RX ORDER — PREDNISOLONE ACETATE 10 MG/ML
1 SUSPENSION/ DROPS OPHTHALMIC 4 TIMES DAILY
COMMUNITY
Start: 2018-01-23 | End: 2018-02-21

## 2018-01-24 ASSESSMENT — VISUAL ACUITY
OD_SC: 20/30
OD_PH_SC: 20/25+2
OS_CC+: -2
OD_SC+: +1
CORRECTION_TYPE: CONTACTS
OS_PH_CC: 20/25
METHOD: SNELLEN - LINEAR
OS_CC: 20/30

## 2018-01-24 ASSESSMENT — TONOMETRY
OS_IOP_MMHG: 16
OD_IOP_MMHG: 22
IOP_METHOD: ICARE

## 2018-01-24 ASSESSMENT — REFRACTION_WEARINGRX
OS_SPHERE: -6.25
OD_CYLINDER: +1.50
OD_SPHERE: -6.25
SPECS_TYPE: PAL
OD_ADD: +1.75
OS_CYLINDER: +0.75
OS_AXIS: 079
OS_ADD: +1.75
OD_AXIS: 108

## 2018-01-24 ASSESSMENT — EXTERNAL EXAM - LEFT EYE: OS_EXAM: NORMAL

## 2018-01-24 ASSESSMENT — CONF VISUAL FIELD
METHOD: COUNTING FINGERS
OS_NORMAL: 1
OD_NORMAL: 1

## 2018-01-24 ASSESSMENT — CUP TO DISC RATIO
OD_RATIO: 0.4
OS_RATIO: 0.4

## 2018-01-24 ASSESSMENT — EXTERNAL EXAM - RIGHT EYE: OD_EXAM: NORMAL

## 2018-01-24 NOTE — NURSING NOTE
Chief Complaints and History of Present Illnesses   Patient presents with     Post Op (Ophthalmology) Right Eye     POD #1 s/p CE/IOL right eye     HPI    Affected eye(s):  Right   Symptoms:     No floaters   No flashes   No redness   No tearing   No Dryness   No itching         Do you have eye pain now?:  No      Comments:  Pt slept well last night. No eye pain yesterday or today, just stinging when drops are used.    Michael GLASS January 24, 2018 9:23 AM

## 2018-01-24 NOTE — MR AVS SNAPSHOT
After Visit Summary   1/24/2018    Emily Mcdermott    MRN: [id number]           Patient Information     Date Of Birth          1960        Visit Information        Provider Department      1/24/2018 9:30 AM Jay Knight MD [facility]        Today's Diagnoses     Dry eyes - Both Eyes    -  1    Pseudophakia - Right Eye        Age-related nuclear cataract of left eye           Follow-ups after your visit        Your next 10 appointments already scheduled     Jan 31, 2018  9:30 AM CST   Post-Op with Jay Knight MD   Eye Clinic ([facility])    [facility]  [address]  [facility] [address]   [phone]            Feb 21, 2018  9:30 AM CST   Post-Op with Jay Knight MD   Eye Clinic ([facility])    [facility]  [address]  [address]  [facility] [address]   [phone]            Mar 14, 2018 10:15 AM CDT   Post-Op with Jay Knight MD   Eye Clinic ([facility])    [facility]  [address]  [address]  [facility] [address]   [phone]            Mar 23, 2018 12:45 PM CDT   Post-Op with Jay Knight MD   Eye Clinic ([facility])    [facility]  [address]  [address]  [facility] [address]   [phone]            Apr 11, 2018 10:15 AM CDT   Post-Op with Jay Knight MD   Eye Clinic ([facility])    [facility]  [address]  [address]  [facility] [address]   [phone]              Who to contact     Please call your clinic at [phone] to:    Ask questions about your health    Make or cancel appointments    Discuss your medicines    Learn about your test results    Speak to your doctor   If you have compliments or concerns about an experience at your clinic, or if you wish to file a complaint, please contact [facility] Patient Relations at  [phone] or email us at [email or web address]         Additional Information About Your Visit        Shiconhart Information     PagosOnLine is an electronic gateway that provides easy, online access to your medical records. With PagosOnLine, you can request a clinic appointment, read your test results, renew a prescription or communicate with your care team.     To sign up for PagosOnLine visit the website at [email or web address]   You will be asked to enter the access code listed below, as well as some personal information. Please follow the directions to create your username and password.     Your access code is: [email or web address]  Expires: 2018  6:30 AM     Your access code will  in 90 days. If you need help or a new code, please contact your [facility] or call [phone] for assistance.        Care EveryWhere ID     This is your Care EveryWhere ID. This could be used by other organizations to access your [place] medical records  [id number]         Blood Pressure from Last 3 Encounters:   18 113/80   08 100/64   06 124/70    Weight from Last 3 Encounters:   18 62.2 kg (137 lb 1.6 oz)   08 84.1 kg (185 lb 6 oz)   06 84.9 kg (187 lb 4 oz)              We Performed the Following     Lydia-Operative Worksheet        Primary Care Provider Office Phone # Fax #    Carlyn YANIRA Huff [phone]       [address] [place], [place] [address] [address] [facility] [address]        Equal Access to Services     JOHN MIDDLETON : Hadii mark pang hadasho Soleslieali, waaxda luqadaha, qaybta kaalmada maximino, emery marcial. So [facility] [phone].    ATENCIÓN: Si habla español, tiene a willis disposición servicios gratuitos de asistencia lingüística. Llame al [phone].    We comply with applicable federal civil rights laws and [place] laws. We do not discriminate on the basis of race, color, national origin, age, disability, sex,  sexual orientation, or gender identity.            Thank you!     Thank you for choosing EYE CLINIC  for your care. Our goal is always to provide you with excellent care. Hearing back from our patients is one way we can continue to improve our services. Please take a few minutes to complete the written survey that you may receive in the mail after your visit with us. Thank you!             Your Updated Medication List - Protect others around you: Learn how to safely use, store and throw away your medicines at www.disposemymeds.org.          This list is accurate as of 1/24/18 10:45 AM.  Always use your most recent med list.                   Brand Name Dispense Instructions for use Diagnosis    ketorolac 0.5 % ophthalmic solution    ACULAR     Apply 1 drop to eye 4 times daily        MAGNESIUM PO      Take 1 Dose by mouth daily        NASACORT AQ 55 MCG/ACT Inhaler   Generic drug:  triamcinolone     1    2 puff each nostril QD (Once per day)    Allergic rhinitis, cause unspecified       ofloxacin 0.3 % ophthalmic solution    OCUFLOX     Apply 1 drop to eye 4 times daily        OMEGA 3 PO      Take 9 mg by mouth daily        prednisoLONE acetate 1 % ophthalmic susp    PRED FORTE     Apply 1 drop to eye 4 times daily        PROAIR  (90 BASE) MCG/ACT Inhaler   Generic drug:  albuterol     1    INHALE 1 TO 2 PUFFS EVERY 4 TO 6 HOURS AS NEEDED    Mild intermittent asthma       PULMICORT FLEXHALER 180 MCG/ACT inhaler   Generic drug:  budesonide     1    in inhalation bid    Mild intermittent asthma       VITAMIN B-12 PO      Take 1 tablet by mouth daily        VITAMIN D (CHOLECALCIFEROL) PO      Take 1 tablet by mouth daily

## 2018-01-24 NOTE — PROGRESS NOTES
Assessment / Plan:    Emily Mcdermott is a 57 year old female who is 1 day s/p cataract extraction with intraocular lens placement right eye.    Off to a good start.    Medications in the surgical eye:    prednisolone acetate 1% four times a day     Ketorolac 0.4% four times a day     Ofloxacin four times a day      Limit heavy lifting, bending over, and heavy exertion. Light aerobic activity is acceptable. Avoid swimming pools, hot tubs, or saunas for 3-4 weeks.   Wear the protective shield at night for the next seven days, and call for increased redness, discharge, pain, or decreased vision.    Return to clinic in approximately 1 week, earlier as needed.    Eder Jaime, DO  Cornea Fellow    ~~~~~~~~~~~~~~~~~~~~~~~~~~~~~~~~~~~~~~~~~~~~~~~~~~~~~~~~~~~~~~~~    Complete documentation of historical and exam elements from today's encounter can be found in the full encounter summary report (not reduplicated in this progress note). I personally obtained the chief complaint(s) and history of present illness.  I confirmed and edited as necessary the review of systems, past medical/surgical history, family history, social history, and examination findings as documented by others; and I examined the patient myself. I personally reviewed the relevant tests, images, and reports as documented above. I formulated and edited as necessary the assessment and plan and discussed the findings and management plan with the patient and family.    Jay Knight MD

## 2018-01-31 ENCOUNTER — OFFICE VISIT (OUTPATIENT)
Dept: OPHTHALMOLOGY | Facility: CLINIC | Age: 58
End: 2018-01-31
Attending: OPHTHALMOLOGY
Payer: COMMERCIAL

## 2018-01-31 DIAGNOSIS — H25.042 POSTERIOR SUBCAPSULAR POLAR AGE-RELATED CATARACT OF LEFT EYE: ICD-10-CM

## 2018-01-31 DIAGNOSIS — Z96.1 PSEUDOPHAKIA: Primary | ICD-10-CM

## 2018-01-31 DIAGNOSIS — H04.123 DRY EYES: ICD-10-CM

## 2018-01-31 PROCEDURE — G0463 HOSPITAL OUTPT CLINIC VISIT: HCPCS | Mod: ZF

## 2018-01-31 RX ORDER — PREDNISOLONE ACETATE 10 MG/ML
1 SUSPENSION/ DROPS OPHTHALMIC 3 TIMES DAILY
Qty: 1 BOTTLE | Refills: 0 | Status: SHIPPED | OUTPATIENT
Start: 2018-01-31 | End: 2018-02-21

## 2018-01-31 ASSESSMENT — VISUAL ACUITY
CORRECTION_TYPE: CONTACTS
METHOD: SNELLEN - LINEAR
OS_CC: 20/25
OD_SC: 20/25
OS_CC+: -2

## 2018-01-31 ASSESSMENT — REFRACTION_WEARINGRX
OD_CYLINDER: +1.50
SPECS_TYPE: PAL
OS_AXIS: 079
OS_ADD: +1.75
OS_SPHERE: -6.25
OS_CYLINDER: +0.75
OD_AXIS: 108
OD_ADD: +1.75
OD_SPHERE: -6.25

## 2018-01-31 ASSESSMENT — CUP TO DISC RATIO
OD_RATIO: 0.4
OS_RATIO: 0.4

## 2018-01-31 ASSESSMENT — TONOMETRY
OS_IOP_MMHG: 13
OD_IOP_MMHG: 13
IOP_METHOD: ICARE

## 2018-01-31 ASSESSMENT — EXTERNAL EXAM - LEFT EYE: OS_EXAM: NORMAL

## 2018-01-31 ASSESSMENT — EXTERNAL EXAM - RIGHT EYE: OD_EXAM: NORMAL

## 2018-01-31 NOTE — MR AVS SNAPSHOT
After Visit Summary   1/31/2018    Emily Mcdermott    MRN: [id number]           Patient Information     Date Of Birth          1960        Visit Information        Provider Department      1/31/2018 9:30 AM Jay Knight MD [facility]        Today's Diagnoses     Pseudophakia - Right Eye    -  1    Dry eyes - Both Eyes        Posterior subcapsular polar age-related cataract of left eye          Care Instructions    STOP ketorolac (GREY Top)  STOP ofloxacin (TAN Top)  Decrease prednisolone (PINK Top) to three times a day for 1 week, then twice a day for 1week, then once a day for 1 week, then STOP          Follow-ups after your visit        Your next 10 appointments already scheduled     Feb 21, 2018  9:30 AM CST   Post-Op with Jay Knight MD   Eye Clinic ([facility])    [facility]  [address]  [facility] [address]   [phone]            Mar 14, 2018 10:15 AM CDT   Post-Op with Jay Knight MD   Eye Clinic ([facility])    [facility]  [address] [facility]  [address] [address]  [facility] [address]   [phone]            Mar 23, 2018 12:45 PM CDT   Post-Op with Jay Knight MD   Eye Clinic ([facility])    [facility]  [address]   [address] [address]  [facility] [address]   [phone]            Apr 11, 2018 10:15 AM CDT   Post-Op with Jay Knight MD   Eye Clinic ([facility])    [facility]  [address] [facility]  [address] [address]  [facility] [address]   [phone]              Who to contact     Please call your clinic at [phone] to:    Ask questions about your health    Make or cancel appointments    Discuss your medicines    Learn about your test results    Speak to your doctor   If you have compliments or concerns about an experience at your clinic, or if you wish to file a complaint, please contact [facility] Patient  Relations at [phone] or email us at [email or web address]         Additional Information About Your Visit        Goodfilms Information     Goodfilms is an electronic gateway that provides easy, online access to your medical records. With Goodfilms, you can request a clinic appointment, read your test results, renew a prescription or communicate with your care team.     To sign up for Goodfilms visit the website at [email or web address]   You will be asked to enter the access code listed below, as well as some personal information. Please follow the directions to create your username and password.     Your access code is: [email or web address]  Expires: 2018  6:30 AM     Your access code will  in 90 days. If you need help or a new code, please contact your [facility] or call [phone] for assistance.        Care EveryWhere ID     This is your Care EveryWhere ID. This could be used by other organizations to access your [place] medical records  [id number]         Blood Pressure from Last 3 Encounters:   18 113/80   08 100/64   06 124/70    Weight from Last 3 Encounters:   18 62.2 kg (137 lb 1.6 oz)   08 84.1 kg (185 lb 6 oz)   06 84.9 kg (187 lb 4 oz)              Today, you had the following     No orders found for display       Primary Care Provider Office Phone # Fax #    Carlyn YANIRA Huff [phone]       [address]  [facility] [address]        Equal Access to Services     JOHN MIDDLETON : Hadrenay Fox, wasriram malloy, qaemery kwok. So [facility] [phone].    ATENCIÓN: Si habla español, tiene a willis disposición servicios gratuitos de asistencia lingüística. Llame al [phone].    We comply with applicable federal civil rights laws and [place] laws. We do not discriminate on the basis of race, color, national origin, age,  disability, sex, sexual orientation, or gender identity.            Thank you!     Thank you for choosing EYE CLINIC  for your care. Our goal is always to provide you with excellent care. Hearing back from our patients is one way we can continue to improve our services. Please take a few minutes to complete the written survey that you may receive in the mail after your visit with us. Thank you!             Your Updated Medication List - Protect others around you: Learn how to safely use, store and throw away your medicines at www.disposemymeds.org.          This list is accurate as of 1/31/18 10:41 AM.  Always use your most recent med list.                   Brand Name Dispense Instructions for use Diagnosis    ketorolac 0.5 % ophthalmic solution    ACULAR     Apply 1 drop to eye 4 times daily        MAGNESIUM PO      Take 1 Dose by mouth daily        NASACORT AQ 55 MCG/ACT Inhaler   Generic drug:  triamcinolone     1    2 puff each nostril QD (Once per day)    Allergic rhinitis, cause unspecified       ofloxacin 0.3 % ophthalmic solution    OCUFLOX     Apply 1 drop to eye 4 times daily        OMEGA 3 PO      Take 9 mg by mouth daily        prednisoLONE acetate 1 % ophthalmic susp    PRED FORTE     Apply 1 drop to eye 4 times daily        PROAIR  (90 BASE) MCG/ACT Inhaler   Generic drug:  albuterol     1    INHALE 1 TO 2 PUFFS EVERY 4 TO 6 HOURS AS NEEDED    Mild intermittent asthma       PULMICORT FLEXHALER 180 MCG/ACT inhaler   Generic drug:  budesonide     1    in inhalation bid    Mild intermittent asthma       VITAMIN B-12 PO      Take 1 tablet by mouth daily        VITAMIN D (CHOLECALCIFEROL) PO      Take 1 tablet by mouth daily

## 2018-01-31 NOTE — PROGRESS NOTES
Assessment / Plan:    Emily Mcdermott is a 57 year old female who is 1 week s/p cataract extraction with intraocular lens placement right eye (1/23/18).    Doing well, no pain, vision improving, has some burning with ketorolac, no flashes, floaters.    Medications in the surgical eye:    Decrease prednisolone acetate 1% TID x 1week, then BID x 1week, then daily x 1week, then STOP   STOP Ketorolac   STOP Ofloxacin     Limit heavy lifting, bending over, and heavy exertion. Light aerobic activity is acceptable. Avoid swimming pools, hot tubs, or saunas for 3-4 weeks.   Wear the protective shield at night for the next seven days, and call for increased redness, discharge, pain, or decreased vision.    Return to clinic in approximately 3-4 week, earlier as needed. MRx OU, DFE OU    ~~~~~~~~~~~~~~~~~~~~~~~~~~~~~~~~~~~~~~~~~~~~~~~~~~~~~~~~~~~~~~~~    Complete documentation of historical and exam elements from today's encounter can be found in the full encounter summary report (not reduplicated in this progress note). I personally obtained the chief complaint(s) and history of present illness.  I confirmed and edited as necessary the review of systems, past medical/surgical history, family history, social history, and examination findings as documented by others; and I examined the patient myself. I personally reviewed the relevant tests, images, and reports as documented above. I formulated and edited as necessary the assessment and plan and discussed the findings and management plan with the patient and family.    Jay Knight MD

## 2018-01-31 NOTE — NURSING NOTE
Chief Complaints and History of Present Illnesses   Patient presents with     Post Op (Ophthalmology) Right Eye      s/p cataract extraction with intraocular lens placement right eye.     HPI    Affected eye(s):  Right   Symptoms:        Duration:  1 week   Frequency:  Constant       Do you have eye pain now?:  No      Comments:  Pt. States that she is doing great!  [facility] RE is much improved.  No c/o comfort BE.  Kelly Pollack COT 9:52 AM January 31, 2018

## 2018-02-21 ENCOUNTER — OFFICE VISIT (OUTPATIENT)
Dept: OPHTHALMOLOGY | Facility: CLINIC | Age: 58
End: 2018-02-21
Attending: OPHTHALMOLOGY
Payer: COMMERCIAL

## 2018-02-21 DIAGNOSIS — H04.123 DRY EYES: ICD-10-CM

## 2018-02-21 DIAGNOSIS — Z96.1 PSEUDOPHAKIA: Primary | ICD-10-CM

## 2018-02-21 DIAGNOSIS — H25.042 POSTERIOR SUBCAPSULAR POLAR AGE-RELATED CATARACT OF LEFT EYE: ICD-10-CM

## 2018-02-21 PROCEDURE — G0463 HOSPITAL OUTPT CLINIC VISIT: HCPCS

## 2018-02-21 PROCEDURE — G0463 HOSPITAL OUTPT CLINIC VISIT: HCPCS | Mod: ZF

## 2018-02-21 PROCEDURE — 92015 DETERMINE REFRACTIVE STATE: CPT | Mod: 52,ZF

## 2018-02-21 ASSESSMENT — REFRACTION_WEARINGRX
OS_CYLINDER: +0.75
OS_ADD: +1.75
OD_SPHERE: -6.25
OD_ADD: +1.75
SPECS_TYPE: PAL
OS_SPHERE: -6.25
OS_AXIS: 079
OD_AXIS: 108
OD_CYLINDER: +1.50

## 2018-02-21 ASSESSMENT — VISUAL ACUITY
OS_CC: 20/30
METHOD: SNELLEN - LINEAR
OD_SC: 20/20
CORRECTION_TYPE: CONTACTS
OS_PH_CC: 20/25

## 2018-02-21 ASSESSMENT — CUP TO DISC RATIO
OS_RATIO: 0.3
OD_RATIO: 0.35

## 2018-02-21 ASSESSMENT — REFRACTION_MANIFEST
OS_CYLINDER: +0.75
OS_SPHERE: -6.25
OS_ADD: +2.50
OD_SPHERE: +0.25
OS_AXIS: 135
OD_CYLINDER: SPHERE
OD_ADD: +2.50

## 2018-02-21 ASSESSMENT — TONOMETRY
OD_IOP_MMHG: 14
OS_IOP_MMHG: 17
IOP_METHOD: TONOPEN

## 2018-02-21 ASSESSMENT — EXTERNAL EXAM - LEFT EYE: OS_EXAM: NORMAL

## 2018-02-21 ASSESSMENT — CONF VISUAL FIELD
OD_NORMAL: 1
METHOD: COUNTING FINGERS
OS_NORMAL: 1

## 2018-02-21 ASSESSMENT — EXTERNAL EXAM - RIGHT EYE: OD_EXAM: NORMAL

## 2018-02-21 NOTE — NURSING NOTE
Chief Complaints and History of Present Illnesses   Patient presents with     Follow Up For     1 month follow up s/p CE/ IOL Right Eye     HPI    Affected eye(s):  Both   Symptoms:     No floaters   No flashes   No redness   No Dryness   No itching         Do you have eye pain now?:  No      Comments:  Pt states vision has been good in RE.     Chacejeanette Alejandra [facility] February 21, 2018 10:06 AM

## 2018-02-21 NOTE — PROGRESS NOTES
Assessment / Plan:    Emily Mcdermott is a 57 year old female who is 1 month s/p cataract extraction with intraocular lens placement right eye (1/23/18).    Interval history: patient has no acute concerns. Eyes have been comfortable without pain. She is very happy with results from surgery. Scheduled for second eye in coming months. No redness or irritation.     A/P  -Patient 20/20 without MRx right eye. Has significant anisometropia scheduled for left eye on 3/13/18.   -Remain off gtts in interim.  -discussed monovision - patient hesitant, but willing to consider mini-monovision.    Return to clinic for post operative visits left eye as scheduled     Constance Huertas MD  Ophthalmology PGY3     ~~~~~~~~~~~~~~~~~~~~~~~~~~~~~~~~~~~~~~~~~~~~~~~~~~~~~~~~~~~~~~~~    Complete documentation of historical and exam elements from today's encounter can be found in the full encounter summary report (not reduplicated in this progress note). I personally obtained the chief complaint(s) and history of present illness.  I confirmed and edited as necessary the review of systems, past medical/surgical history, family history, social history, and examination findings as documented by others; and I examined the patient myself. I personally reviewed the relevant tests, images, and reports as documented above. I formulated and edited as necessary the assessment and plan and discussed the findings and management plan with the patient and family.    Jay Knight MD

## 2018-02-21 NOTE — MR AVS SNAPSHOT
After Visit Summary   2/21/2018    Emily Mcdermott    MRN: [id number]           Patient Information     Date Of Birth          1960        Visit Information        Provider Department      2/21/2018 9:30 AM Jay [facility] MD [facility]        Today's Diagnoses     Pseudophakia - Right Eye    -  1    Dry eyes - Both Eyes        Posterior subcapsular polar age-related cataract of left eye           Follow-ups after your visit        Follow-up notes from your care team     Return for Follow Up as scheduled post op os .      Your next 10 appointments already scheduled     Apr 11, 2018  9:15 AM CDT   Post-Op with Jay Knight MD   Eye Clinic ([facility])    [address] [place] [address]   [phone]            Apr 18, 2018  9:30 AM CDT   Post-Op with Jay Knight MD   Eye Clinic ([facility])    [address] [place] [address]   [phone]            May 09, 2018  9:15 AM CDT   Post-Op with Jay Knight MD   Eye Clinic ([facility])    [address] [place] [address]   [phone]              Who to contact     Please call your clinic at [phone] to:    Ask questions about your health    Make or cancel appointments    Discuss your medicines    Learn about your test results    Speak to your doctor            Additional Information About Your Visit        MyChart Information     VIEO is an electronic gateway that provides easy, online access to your medical records. With VIEO, you can request a clinic appointment, read your test results, renew a prescription or communicate with your care team.     To sign up for VIEO visit the website at [email or web address]   You will be asked to enter the access code listed below, as well as some personal information.  Please follow the directions to create your username and password.     Your access code is: [email or web address]  Expires: 6/10/2018  6:30 AM     Your access code will  in 90 days. If you need help or a new code, please contact your [facility] or call [phone] for assistance.        Care EveryWhere ID     This is your Care EveryWhere ID. This could be used by other organizations to access your [facility] medical records  [id number]         Blood Pressure from Last 3 Encounters:   18 113/80   08 100/64   06 124/70    Weight from Last 3 Encounters:   18 62.2 kg (137 lb 1.6 oz)   08 84.1 kg (185 lb 6 oz)   06 84.9 kg (187 lb 4 oz)              Today, you had the following     No orders found for display       Primary Care Provider Office Phone # Fax #    Carlyn Huff, AGUILA [facility] [phone]       [address]  [facility] [address]        Equal Access to Services     JOHN [place] : Hadii aad ku hadasho Soomaali, waaxda luqadaha, qaybta kaalmada adeegyada, emery chu . So [facility] [phone].    ATENCIÓN: Si habla español, tiene a willis disposición servicios gratuitos de asistencia lingüística. Llame al [phone].    We comply with applicable federal civil rights laws and [place] laws. We do not discriminate on the basis of race, color, national origin, age, disability, sex, sexual orientation, or gender identity.            Thank you!     Thank you for choosing E[facility] CLINIC  for your care. Our goal is always to provide you with excellent care. Hearing back from our patients is one way we can continue to improve our services. Please take a few minutes to complete the written survey that you may receive in the mail after your visit with us. Thank you!             Your Updated Medication List - Protect others around you: Learn how to safely use, store and throw away your medicines at  www.disposemymeds.org.          This list is accurate as of 2/21/18 11:59 PM.  Always use your most recent med list.                   Brand Name Dispense Instructions for use Diagnosis    MAGNESIUM PO      Take 1 Dose by mouth daily        NASACORT AQ 55 MCG/ACT Inhaler   Generic drug:  triamcinolone     1    2 puff each nostril QD (Once per day)    Allergic rhinitis, cause unspecified       OMEGA 3 PO      Take 9 mg by mouth daily        PROAIR  (90 BASE) MCG/ACT Inhaler   Generic drug:  albuterol     1    INHALE 1 TO 2 PUFFS EVERY 4 TO 6 HOURS AS NEEDED    Mild intermittent asthma       PULMICORT FLEXHALER 180 MCG/ACT inhaler   Generic drug:  budesonide     1    in inhalation bid    Mild intermittent asthma       VITAMIN B-12 PO      Take 1 tablet by mouth daily        VITAMIN D (CHOLECALCIFEROL) PO      Take 1 tablet by mouth daily

## 2018-03-07 ENCOUNTER — TELEPHONE (OUTPATIENT)
Dept: OPHTHALMOLOGY | Facility: CLINIC | Age: 58
End: 2018-03-07

## 2018-03-21 ENCOUNTER — TELEPHONE (OUTPATIENT)
Dept: OPHTHALMOLOGY | Facility: CLINIC | Age: 58
End: 2018-03-21

## 2018-04-05 ENCOUNTER — OFFICE VISIT (OUTPATIENT)
Dept: FAMILY MEDICINE | Facility: CLINIC | Age: 58
End: 2018-04-05
Payer: COMMERCIAL

## 2018-04-05 VITALS
RESPIRATION RATE: 12 BRPM | HEART RATE: 83 BPM | WEIGHT: 135.5 LBS | TEMPERATURE: 98 F | BODY MASS INDEX: 22.57 KG/M2 | DIASTOLIC BLOOD PRESSURE: 75 MMHG | HEIGHT: 65 IN | SYSTOLIC BLOOD PRESSURE: 118 MMHG | OXYGEN SATURATION: 99 %

## 2018-04-05 DIAGNOSIS — Z01.818 PRE-OP EXAM: Primary | ICD-10-CM

## 2018-04-05 ASSESSMENT — ANXIETY QUESTIONNAIRES
5. BEING SO RESTLESS THAT IT IS HARD TO SIT STILL: NOT AT ALL
6. BECOMING EASILY ANNOYED OR IRRITABLE: NOT AT ALL
GAD7 TOTAL SCORE: 0
1. FEELING NERVOUS, ANXIOUS, OR ON EDGE: NOT AT ALL
7. FEELING AFRAID AS IF SOMETHING AWFUL MIGHT HAPPEN: NOT AT ALL
3. WORRYING TOO MUCH ABOUT DIFFERENT THINGS: NOT AT ALL
2. NOT BEING ABLE TO STOP OR CONTROL WORRYING: NOT AT ALL
IF YOU CHECKED OFF ANY PROBLEMS ON THIS QUESTIONNAIRE, HOW DIFFICULT HAVE THESE PROBLEMS MADE IT FOR YOU TO DO YOUR WORK, TAKE CARE OF THINGS AT HOME, OR GET ALONG WITH OTHER PEOPLE: NOT DIFFICULT AT ALL

## 2018-04-05 ASSESSMENT — PATIENT HEALTH QUESTIONNAIRE - PHQ9: 5. POOR APPETITE OR OVEREATING: NOT AT ALL

## 2018-04-05 NOTE — MR AVS SNAPSHOT
After Visit Summary   2018    Emily Mcdermott    MRN: [id number]           Patient Information     Date Of Birth          1960        Visit Information        Provider Department      2018 10:00 AM Giulia Julian NP [facility]        Today's Diagnoses     Pre-op exam    -  1       Follow-ups after your visit        Your next 10 appointments already scheduled     2018  9:15 AM CDT   Post-Op with Jay Knight MD   Eye Clinic ([facility])    [address] [address]   [phone]            2018  9:30 AM CDT   Post-Op with Jay Knight MD   Eye Clinic ([facility])    [address] [address]   [phone]            May 09, 2018  9:15 AM CDT   Post-Op with Jay Knight MD   Eye Clinic ([facility])    [address] [address]   [phone]              Who to contact     Please call your clinic at [phone] to:    Ask questions about your health    Make or cancel appointments    Discuss your medicines    Learn about your test results    Speak to your doctor            Additional Information About Your Visit        MyChart Information     CryptoSealt is an electronic gateway that provides easy, online access to your medical records. With Foxtrot, you can request a clinic appointment, read your test results, renew a prescription or communicate with your care team.     To sign up for CryptoSealt visit the website at [email or web address]   You will be asked to enter the access code listed below, as well as some personal information. Please follow the directions to create your username and password.     Your access code is: [email or web address]  Expires: 6/10/2018  6:30 AM     Your access code will  in 90 days. If you need help  "or a new code, please contact your [facility] or call [phone] for assistance.        Care EveryWhere ID     This is your Care EveryWhere ID. This could be used by other organizations to access your [facility] medical records  [id number]        Your Vitals Were     Pulse Temperature Respirations Height Last Period Pulse Oximetry    83 98  F (36.7  C) (Oral) 12 5' 4.7\" (164.3 cm) (LMP Unknown) 99%    Breastfeeding? BMI (Body Mass Index)                No 22.76 kg/m2           Blood Pressure from Last 3 Encounters:   04/05/18 118/75   01/19/18 113/80   03/19/08 100/64    Weight from Last 3 Encounters:   04/05/18 135 lb 8 oz (61.5 kg)   01/19/18 137 lb 1.6 oz (62.2 kg)   03/19/08 185 lb 6 oz (84.1 kg)              Today, you had the following     No orders found for display       Primary Care Provider Office Phone # Fax #    Carlyn AGUILA Huff [facility] [phone]       [address] [facility] [address]        Equal Access to Services     [facility]: Hadii aad ku hadasho Soomaali, waaxda luqadaha, qaybta kaalmada adeegyada, emery merino haycarinan muriel chu . So [facility] [phone].    ATENCIÓN: Si habla español, tiene a willis disposición servicios gratuitos de asistencia lingüística. Llame al [phone].    We comply with applicable federal civil rights laws and [place] laws. We do not discriminate on the basis of race, color, national origin, age, disability, sex, sexual orientation, or gender identity.            Thank you!     Thank you for choosing [facility]  for your care. Our goal is always to provide you with excellent care. Hearing back from our patients is one way we can continue to improve our services. Please take a few minutes to complete the written survey that you may receive in the mail after your visit with us. Thank you!             Your Updated Medication List - Protect others around you: Learn how to safely use, store " and throw away your medicines at www.disposemymeds.org.          This list is accurate as of 4/5/18 10:43 AM.  Always use your most recent med list.                   Brand Name Dispense Instructions for use Diagnosis    MAGNESIUM PO      Take 1 Dose by mouth daily        NASACORT AQ 55 MCG/ACT Inhaler   Generic drug:  triamcinolone     1    2 puff each nostril QD (Once per day)    Allergic rhinitis, cause unspecified       OMEGA 3 PO      Take 9 mg by mouth daily        PROAIR  (90 BASE) MCG/ACT Inhaler   Generic drug:  albuterol     1    INHALE 1 TO 2 PUFFS EVERY 4 TO 6 HOURS AS NEEDED    Mild intermittent asthma       PULMICORT FLEXHALER 180 MCG/ACT inhaler   Generic drug:  budesonide     1    in inhalation bid    Mild intermittent asthma       VITAMIN B-12 PO      Take 1 tablet by mouth daily        VITAMIN D (CHOLECALCIFEROL) PO      Take 1 tablet by mouth daily

## 2018-04-05 NOTE — PROGRESS NOTES
[facility]  [address] [address]  [phone]  Dept: [phone]    PRE-OP EVALUATION:  Today's date: 2018    Emily Mcdermott (: 1960) presents for pre-operative evaluation assessment as requested by Dr. Knight.  She requires evaluation and anesthesia risk assessment prior to undergoing surgery/procedure for treatment of pseudophakia left eye .  Proposed procedure: left cataract extract with implant    Date of Surgery/ Procedure: 4/10/18  Time of Surgery/ Procedure: unknown  Hospital/Surgical Facility: [facility]  Fax:  [phone]  Primary Physician: Carlyn Huff Jane Anderson  Type of Anesthesia Anticipated: Local    Patient has a Health Care Directive or Living Will:  NO    1. NO - Do you have a history of heart attack, stroke, stent, bypass or surgery on an artery in the head, neck, heart or legs?  2. NO - Do you ever have any pain or discomfort in your chest?  3. NO - Do you have a history of  Heart Failure?  4. NO - Are you troubled by shortness of breath when: walking on the level, up a slight hill or at night?  5. NO - Do you currently have a cold, bronchitis or other respiratory infection?  6. NO - Do you have a cough, shortness of breath or wheezing?  7. NO - Do you sometimes get pains in the calves of your legs when you walk?  8. NO - Do you or anyone in your family have previous history of blood clots?  9. NO - Do you or does anyone in your family have a serious bleeding problem such as prolonged bleeding following surgeries or cuts?  10. NO - Have you ever had problems with anemia or been told to take iron pills?  11. NO - Have you had any abnormal blood loss such as black, tarry or bloody stools, or abnormal vaginal bleeding?  12. NO - Have you ever had a blood transfusion?  13. NO - Have you or any of your relatives ever had problems with anesthesia?  14. NO - Do you have sleep apnea, excessive snoring or daytime drowsiness?  15. NO - Do you  have any prosthetic heart valves?  16. NO - Do you have prosthetic joints?  17. NO - Is there any chance that you may be pregnant?    History of anemia greater than 20 years ago  Past history of DM2 and intermittent asthma, resolved 6 years ago after embracing vegan diet      HPI:                                                      Brief HPI related to upcoming procedure: history of cataracts, bilaterally.  Right lens extraction with implant 1/23/18.      MEDICAL HISTORY:                                                      Patient Active Problem List    Diagnosis Date Noted     CARDIOVASCULAR SCREENING; LDL GOAL LESS THAN 100 05/09/2010     Priority: Medium     TYPE 2 DIABETES, HBA1C GOAL < 7% 05/02/2010     Priority: Medium     Allergic rhinitis 03/19/2008     Priority: Medium     Problem list name updated by automated process. Provider to review       Mild intermittent asthma      Priority: Medium     Diabetes mellitus, type 2 (H) 12/29/2005     Priority: Medium     Problem list name updated by automated process. Provider to review        Past Medical History:   Diagnosis Date     Allergic rhinitis, cause unspecified     Allergic rhinitis     Mild intermittent asthma     Asymptomatic for 6 years post Vegan diet     Type II or unspecified type diabetes mellitus without mention of complication, not stated as uncontrolled 2004    [facility]ed [facility] previously. Not currently on medications for DM     Past Surgical History:   Procedure Laterality Date     HYSTERECTOMY, VAGINAL  1998     SURGICAL HISTORY OF -       nasal surgery for chronic sinusitis     Current Outpatient Prescriptions   Medication Sig Dispense Refill     Cyanocobalamin (VITAMIN B-12 PO) Take 1 tablet by mouth daily       Omega-3 Fatty Acids (OMEGA 3 PO) Take 9 mg by mouth daily       MAGNESIUM PO Take 1 Dose by mouth daily       VITAMIN D, CHOLECALCIFEROL, PO Take 1 tablet by mouth daily                                 Allergies  "  Allergen Reactions     Penicillins       Latex Allergy: NO    Social History   Substance Use Topics     Smoking status: Former Smoker     Quit date: 1982     Smokeless tobacco: Former User     Quit date: 1982     Alcohol use Not on file     History   Drug Use Not on file     Alcohol:  4 beverages/month  REVIEW OF SYSTEMS:                                                    CONSTITUTIONAL: NEGATIVE for fever, chills, change in weight  INTEGUMENTARY/SKIN: NEGATIVE for worrisome rashes, moles or lesions  EYES: NEGATIVE for vision changes or irritation  ENT/MOUTH: NEGATIVE for ear, mouth and throat problems  RESP: NEGATIVE for significant cough or SOB  BREAST: NEGATIVE for masses, tenderness or discharge  CV: NEGATIVE for chest pain, palpitations or peripheral edema, very occasional ankle swelling after a long day of walking  GI: NEGATIVE for nausea, abdominal pain, heartburn, or change in bowel habits  : NEGATIVE for frequency, dysuria, or hematuria  MUSCULOSKELETAL: NEGATIVE for significant arthralgias or myalgia  NEURO: NEGATIVE for weakness, dizziness or paresthesias  ENDOCRINE: NEGATIVE for temperature intolerance, skin/hair changes  HEME: NEGATIVE for bleeding problems  PSYCHIATRIC: NEGATIVE for changes in mood or affect    EXAM:                                                    /75 (BP Location: Left arm, Patient Position: Chair, Cuff Size: Adult Regular)  Pulse 83  Temp 98  F (36.7  C) (Oral)  Resp 12  Ht 5' 4.7\" (164.3 cm)  Wt 135 lb 8 oz (61.5 kg)  LMP  (LMP Unknown)  SpO2 99%  Breastfeeding? No  BMI 22.76 kg/m2    GENERAL APPEARANCE: healthy, alert and no distress     EYES: EOMI, PERRL     HENT: ear canals and TM's normal and nose and mouth without ulcers or lesions     NECK: no adenopathy, no asymmetry, masses, or scars and thyroid normal to palpation     RESP: lungs clear to auscultation - no rales, rhonchi or wheezes     CV: regular rates and rhythm, normal S1 S2, no S3 or S4 and no " murmur, click or rub     ABDOMEN:  soft, nontender, no HSM or masses and bowel sounds normal     MS: extremities normal- no gross deformities noted, no evidence of inflammation in joints, FROM in all extremities.     SKIN: no suspicious lesions or rashes     NEURO: Normal strength and tone, sensory exam grossly normal, mentation intact and speech normal     PSYCH: mentation appears normal. and affect normal/bright     LYMPHATICS: No cervical adenopathy    DIAGNOSTICS:                                                    No labs or EKG required for low risk surgery (cataract, skin procedure, breast biopsy, etc)    No results for input(s): HGB, PLT, INR, NA, POTASSIUM, CR, A1C in the last 04503 hours.     IMPRESSION:                                                    Reason for surgery/procedure: left eye cataract, diminished vision  Diagnosis/reason for consult: pre-op for surgery    The proposed surgical procedure is considered LOW risk.    REVISED CARDIAC RISK INDEX  The patient has the following serious cardiovascular risks for perioperative complications such as (MI, PE, VFib and 3  AV Block):  No serious cardiac risks  INTERPRETATION: 0 risks: Class I (very low risk - 0.4% complication rate)    The patient has the following additional risks for perioperative complications:  No identified additional risks    APPROVAL GIVEN to proceed with proposed procedure, without further diagnostic evaluation      RECOMMENDATIONS:                                                    --Consult hospital rounder / IM to assist post-op medical management if needed     Signed Electronically by: Giulia Julian NP    Copy of this evaluation report is provided to requesting physician.    Juan A Preop Guidelines

## 2018-04-05 NOTE — NURSING NOTE
"57 year old  Chief Complaint   Patient presents with     Pre-Op Exam     Pre op visit for Left eye cataract surgery scheduled for 4/10/2018       Blood pressure 118/75, pulse 83, temperature 98  F (36.7  C), temperature source Oral, resp. rate 12, height 5' 4.7\" (164.3 cm), weight 135 lb 8 oz (61.5 kg), SpO2 99 %, not currently breastfeeding. Body mass index is 22.76 kg/(m^2).  BP completed using cuff size:    Patient Active Problem List   Diagnosis     Diabetes mellitus, type 2 (H)     Mild intermittent asthma     Allergic rhinitis     TYPE 2 DIABETES, HBA1C GOAL < 7%     CARDIOVASCULAR SCREENING; LDL GOAL LESS THAN 100    Neck circumference 14.25\"    Surgery scheduled for 4/10/2018 with Dr. Knight     Wt Readings from Last 2 Encounters:   04/05/18 135 lb 8 oz (61.5 kg)   01/19/18 137 lb 1.6 oz (62.2 kg)     BP Readings from Last 3 Encounters:   04/05/18 118/75   01/19/18 113/80   03/19/08 100/64       Allergies   Allergen Reactions     Penicillins        Current Outpatient Prescriptions   Medication     Cyanocobalamin (VITAMIN B-12 PO)     Omega-3 Fatty Acids (OMEGA 3 PO)     MAGNESIUM PO     VITAMIN D, CHOLECALCIFEROL, PO     PULMICORT FLEXHALER 180 MCG/ACT IN INHA     NASACORT AQ 55 MCG/ACT NA AERS     PROAIR  (90 BASE) MCG/ACT IN AERS     No current facility-administered medications for this visit.        Social History   Substance Use Topics     Smoking status: Former Smoker     Quit date: 1982     Smokeless tobacco: Former User     Quit date: 1982     Alcohol use Not on file       Health Maintenance Due   Topic Date Due     FOOT EXAM Q1 YEAR  08/05/1961     MICROALBUMIN Q1 YEAR  08/05/1961     PNEUMOVAX 1X HI RISK PATIENT < 65 (NO IB MSG)  08/05/1962     TETANUS IMMUNIZATION (SYSTEM ASSIGNED)  08/05/1978     HEPATITIS C SCREENING  08/05/1978     PAP SCREENING Q3 YR (SYSTEM ASSIGNED)  08/05/1981     CREATININE Q1 YEAR  07/09/2006     A1C Q3 MO  01/15/2008     LIPID MONITORING Q3 MO  08/13/2008     TSH W/ " FREE T4 REFLEX Q2 YEAR  10/15/2009     MAMMO SCREEN Q2 YR (SYSTEM ASSIGNED)  08/05/2010     COLON CANCER SCREEN (SYSTEM ASSIGNED)  08/05/2010     ADVANCE DIRECTIVE PLANNING Q5 YRS  08/05/2015       No results found for: PAP    PHQ-2 ( 1999 Pf[employer]) 4/5/2018 1/19/2018   Q1: Little interest or pleasure in doing things 0 0   Q2: Feeling down, depressed or hopeless 0 0   PHQ-2 Score 0 0       PHQ-9 SCORE 1/19/2018 4/5/2018   Total Score 0 0       SENIA-7 SCORE 1/19/2018 4/5/2018   Total Score 0 0       No flowsheet data found.    Heather Clarke CMA  April 5, 2018 9:57 AM

## 2018-04-06 ASSESSMENT — ANXIETY QUESTIONNAIRES: GAD7 TOTAL SCORE: 0

## 2018-04-06 ASSESSMENT — PATIENT HEALTH QUESTIONNAIRE - PHQ9: SUM OF ALL RESPONSES TO PHQ QUESTIONS 1-9: 0

## 2018-04-10 ENCOUNTER — TRANSFERRED RECORDS (OUTPATIENT)
Dept: HEALTH INFORMATION MANAGEMENT | Facility: CLINIC | Age: 58
End: 2018-04-10

## 2018-04-11 ENCOUNTER — OFFICE VISIT (OUTPATIENT)
Dept: OPHTHALMOLOGY | Facility: CLINIC | Age: 58
End: 2018-04-11
Attending: OPHTHALMOLOGY
Payer: COMMERCIAL

## 2018-04-11 DIAGNOSIS — Z96.1 PSEUDOPHAKIA OF BOTH EYES: ICD-10-CM

## 2018-04-11 DIAGNOSIS — Z48.810 AFTERCARE FOLLOWING SURGERY OF A SENSORY ORGAN: Primary | ICD-10-CM

## 2018-04-11 PROCEDURE — G0463 HOSPITAL OUTPT CLINIC VISIT: HCPCS | Mod: ZF

## 2018-04-11 ASSESSMENT — VISUAL ACUITY
METHOD: SNELLEN - LINEAR
OD_CC: 20/15
OS_CC+: -3
CORRECTION_TYPE: GLASSES
OS_CC: 20/25

## 2018-04-11 ASSESSMENT — TONOMETRY
IOP_METHOD: ICARE
OS_IOP_MMHG: 23
OD_IOP_MMHG: 14

## 2018-04-11 ASSESSMENT — EXTERNAL EXAM - RIGHT EYE: OD_EXAM: NORMAL

## 2018-04-11 ASSESSMENT — EXTERNAL EXAM - LEFT EYE: OS_EXAM: NORMAL

## 2018-04-11 NOTE — MR AVS SNAPSHOT
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      Your next 10 appointments already scheduled     2018  9:30 AM CDT   Post-Op with Jay Knight MD   Eye Clinic ([facility])    [address] [address]   [phone]            May 09, 2018  9:15 AM CDT   Post-Op with Jay Knight MD   Eye Clinic ([facility])    [address] [address]   [phone]              Who to contact     Please call your clinic at [phone] to:    Ask questions about your health    Make or cancel appointments    Discuss your medicines    Learn about your test results    Speak to your doctor            Additional Information About Your Visit        MyChart Information     Yadio is an electronic gateway that provides easy, online access to your medical records. With Yadio, you can request a clinic appointment, read your test results, renew a prescription or communicate with your care team.     To sign up for Invenit visit the website at [email or web address]   You will be asked to enter the access code listed below, as well as some personal information. Please follow the directions to create your username and password.     Your access code is: [email or web address]  Expires: 6/10/2018  6:30 AM     Your access code will  in 90 days. If you need help or  a new code, please contact your [facility] or call [phone] for assistance.        Care EveryWhere ID     This is your Care EveryWhere ID. This could be used by other organizations to access your [place] medical records  [id number]        Your Vitals Were     Last Period                   (LMP Unknown)            Blood Pressure from Last 3 Encounters:   04/05/18 118/75   01/19/18 113/80   03/19/08 100/64    Weight from Last 3 Encounters:   04/05/18 61.5 kg (135 lb 8 oz)   01/19/18 62.2 kg (137 lb 1.6 oz)   03/19/08 84.1 kg (185 lb 6 oz)              Today, you had the following     No orders found for display       Primary Care Provider Office Phone # Fax #    AGUILA Irene SALOME [phone]       [address]  [facility] [address]        Equal Access to Services     JOHN MIDDLETON : Hadii aad ku toshiao Somarkell, waaxda luqadaha, qaybta kaalmada adecésaryada, emery chu . So [facility] [phone].    ATENCIÓN: Si navdeep weinberg, tiene a willis disposición servicios gratuitos de asistencia lingüística. Llame al [phone].    We comply with applicable federal civil rights laws and [place] laws. We do not discriminate on the basis of race, color, national origin, age, disability, sex, sexual orientation, or gender identity.            Thank you!     Thank you for choosing EYE CLINIC  for your care. Our goal is always to provide you with excellent care. Hearing back from our patients is one way we can continue to improve our services. Please take a few minutes to complete the written survey that you may receive in the mail after your visit with us. Thank you!             Your Updated Medication List - Protect others around you: Learn how to safely use, store and throw away your medicines at www.disposemymeds.org.          This list is accurate as of 4/11/18 11:04 AM.  Always use your most recent med list.                   Brand  Name Dispense Instructions for use Diagnosis    MAGNESIUM PO      Take 1 Dose by mouth daily        NASACORT AQ 55 MCG/ACT Inhaler   Generic drug:  triamcinolone     1    2 puff each nostril QD (Once per day)    Allergic rhinitis, cause unspecified       OMEGA 3 PO      Take 9 mg by mouth daily        PROAIR  (90 Base) MCG/ACT Inhaler   Generic drug:  albuterol     1    INHALE 1 TO 2 PUFFS EVERY 4 TO 6 HOURS AS NEEDED    Mild intermittent asthma       PULMICORT FLEXHALER 180 MCG/ACT inhaler   Generic drug:  budesonide     1    in inhalation bid    Mild intermittent asthma       VITAMIN B-12 PO      Take 1 tablet by mouth daily        VITAMIN D (CHOLECALCIFEROL) PO      Take 1 tablet by mouth daily

## 2018-04-11 NOTE — NURSING NOTE
Chief Complaints and History of Present Illnesses   Patient presents with     Follow Up For     LE PCIOL 04/10/2018     HPI    Affected eye(s):  Left   Symptoms:     No blurred vision   No decreased vision         Do you have eye pain now?:  No      Comments:  Radha Cazaers COA 9:40 AM April 11, 2018

## 2018-04-11 NOTE — PROGRESS NOTES
Assessment / Plan:    Emily Mcdermott is a 57 year old female who is 1 day s/p cataract extraction with intraocular lens placement OS.    Off to a good start.  Minimoovision target in OS: patient very happy    Tr PCO    Medications in the surgical eye:    prednisolone acetate 1% four times a day     Ketorolac 0.4% four times a day     Ofloxacin four times a day      Limit heavy lifting, bending over, and heavy exertion. Light aerobic activity is acceptable. Avoid swimming pools, hot tubs, or saunas for 3-4 weeks.   Wear the protective shield at night for the next seven days, and call for increased redness, discharge, pain, or decreased vision.    Return to clinic in approximately 1 week, earlier as needed.    REMI Magallon  Cornea fellow    ~~~~~~~~~~~~~~~~~~~~~~~~~~~~~~~~~~~~~~~~~~~~~~~~~~~~~~~~~~~~~~~~    Complete documentation of historical and exam elements from today's encounter can be found in the full encounter summary report (not reduplicated in this progress note). I personally obtained the chief complaint(s) and history of present illness.  I confirmed and edited as necessary the review of systems, past medical/surgical history, family history, social history, and examination findings as documented by others; and I examined the patient myself. I personally reviewed the relevant tests, images, and reports as documented above. I formulated and edited as necessary the assessment and plan and discussed the findings and management plan with the patient and family.    Jay Knight MD

## 2018-04-18 ENCOUNTER — OFFICE VISIT (OUTPATIENT)
Dept: OPHTHALMOLOGY | Facility: CLINIC | Age: 58
End: 2018-04-18
Attending: OPHTHALMOLOGY
Payer: COMMERCIAL

## 2018-04-18 ENCOUNTER — TELEPHONE (OUTPATIENT)
Dept: OPHTHALMOLOGY | Facility: CLINIC | Age: 58
End: 2018-04-18

## 2018-04-18 DIAGNOSIS — H04.123 DRY EYES: ICD-10-CM

## 2018-04-18 DIAGNOSIS — Z96.1 PSEUDOPHAKIA OF BOTH EYES: Primary | ICD-10-CM

## 2018-04-18 PROCEDURE — G0463 HOSPITAL OUTPT CLINIC VISIT: HCPCS | Mod: ZF

## 2018-04-18 ASSESSMENT — TONOMETRY
OS_IOP_MMHG: 21
IOP_METHOD: TONOPEN
OD_IOP_MMHG: 16

## 2018-04-18 ASSESSMENT — VISUAL ACUITY
OS_SC: 20/25
METHOD: SNELLEN - LINEAR
OD_SC: 20/15
OS_SC+: +1

## 2018-04-18 ASSESSMENT — EXTERNAL EXAM - RIGHT EYE: OD_EXAM: NORMAL

## 2018-04-18 ASSESSMENT — EXTERNAL EXAM - LEFT EYE: OS_EXAM: NORMAL

## 2018-04-18 NOTE — MR AVS SNAPSHOT
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    -  1    Dry eyes - Both Eyes           Follow-ups after your visit        Your next 10 appointments already scheduled     May 09, 2018  9:15 AM CDT   Post-Op with Jay Knight MD   Eye Clinic ([facility])    [address]  [address] [address]  [facility] [address]   [phone]              Who to contact     Please call your clinic at [phone] to:    Ask questions about your health    Make or cancel appointments    Discuss your medicines    Learn about your test results    Speak to your doctor            Additional Information About Your Visit        MyChart Information     myTomorrowst is an electronic gateway that provides easy, online access to your medical records. With Meusonic, you can request a clinic appointment, read your test results, renew a prescription or communicate with your care team.     To sign up for myTomorrowst visit the website at [email or web address]   You will be asked to enter the access code listed below, as well as some personal information. Please follow the directions to create your username and password.     Your access code is: [email or web address]  Expires: 6/10/2018  6:30 AM     Your access code will  in 90 days. If you need help or a new code, please contact your [facility] or call [phone] for assistance.        Care EveryWhere ID     This is your Care EveryWhere ID. This could be used by other organizations to access your [place] medical records  [id number]        Your Vitals Were     Last Period                   (LMP Unknown)            Blood Pressure from Last 3  Encounters:   04/05/18 118/75   01/19/18 113/80   03/19/08 100/64    Weight from Last 3 Encounters:   04/05/18 61.5 kg (135 lb 8 oz)   01/19/18 62.2 kg (137 lb 1.6 oz)   03/19/08 84.1 kg (185 lb 6 oz)              Today, you had the following     No orders found for display       Primary Care Provider Office Phone # Fax #    AGUILA Irene [facility] [phone]       [address]  [facility] [address]        Equal Access to Services     [facility]: Hadii aad poly hadnorman Somarkell, waitzda mellissa, qaprettyta kaalmada maximino, emery chu . So [facility] [phone].    ATENCIÓN: Si habla español, tiene a willis disposición servicios gratuitos de asistencia lingüística. Llame al [phone].    We comply with applicable federal civil rights laws and [place] laws. We do not discriminate on the basis of race, color, national origin, age, disability, sex, sexual orientation, or gender identity.            Thank you!     Thank you for choosing EYE CLINIC  for your care. Our goal is always to provide you with excellent care. Hearing back from our patients is one way we can continue to improve our services. Please take a few minutes to complete the written survey that you may receive in the mail after your visit with us. Thank you!             Your Updated Medication List - Protect others around you: Learn how to safely use, store and throw away your medicines at www.disposemymeds.org.          This list is accurate as of 4/18/18 10:48 AM.  Always use your most recent med list.                   Brand Name Dispense Instructions for use Diagnosis    MAGNESIUM PO      Take 1 Dose by mouth daily        NASACORT AQ 55 MCG/ACT Inhaler   Generic drug:  triamcinolone     1    2 puff each nostril QD (Once per day)    Allergic rhinitis, cause unspecified       OMEGA 3 PO      Take 9 mg by mouth daily        PROAIR  (90 Base) MCG/ACT Inhaler   Generic drug:  albuterol     1     INHALE 1 TO 2 PUFFS EVERY 4 TO 6 HOURS AS NEEDED    Mild intermittent asthma       PULMICORT FLEXHALER 180 MCG/ACT inhaler   Generic drug:  budesonide     1    in inhalation bid    Mild intermittent asthma       VITAMIN B-12 PO      Take 1 tablet by mouth daily        VITAMIN D (CHOLECALCIFEROL) PO      Take 1 tablet by mouth daily

## 2018-04-18 NOTE — NURSING NOTE
Chief Complaints and History of Present Illnesses   Patient presents with     Post Op (Ophthalmology) Left Eye     S/P IOL LE 1 week      HPI    Affected eye(s):  Left   Symptoms:     Floaters   No flashes      Duration:  1 week   Frequency:  Constant          Comments:  Pt very happy with vision and results. No problems using drops and last drop used this morning. Denies any pain or discomfort. MALA SILVER, COA 9:55 AM 04/18/2018

## 2018-04-18 NOTE — PROGRESS NOTES
Assessment / Plan:    Emily Mcdermott is a 57 year old female who is 1 day s/p cataract extraction with intraocular lens placement OS.    Off to a good start.  Minimonovision target in OS: patient very happy    Reports mild flashes and floters since surgery. Not worsening  History of similar symptoms in OD as well postop    Tr PCO    Medications in the surgical eye:    prednisolone acetate 1% 3-2-1 x 1 week each    Stop Ketorolac 0.4% four times a day     Stop Ofloxacin four times a day      Limit heavy lifting, bending over, and heavy exertion. Light aerobic activity is acceptable. Avoid swimming pools, hot tubs, or saunas for 3-4 weeks.   Wear the protective shield at night for the next seven days, and call for increased redness, discharge, pain, or decreased vision.    Return to clinic in approximately 3-4 weeks, DFE OU, MRx OU    REMI Magallon  Cornea fellow    ~~~~~~~~~~~~~~~~~~~~~~~~~~~~~~~~~~~~~~~~~~~~~~~~~~~~~~~~~~~~~~~~    Complete documentation of historical and exam elements from today's encounter can be found in the full encounter summary report (not reduplicated in this progress note). I personally obtained the chief complaint(s) and history of present illness.  I confirmed and edited as necessary the review of systems, past medical/surgical history, family history, social history, and examination findings as documented by others; and I examined the patient myself. I personally reviewed the relevant tests, images, and reports as documented above. I formulated and edited as necessary the assessment and plan and discussed the findings and management plan with the patient and family.    Jay Knight MD

## 2018-04-18 NOTE — TELEPHONE ENCOUNTER
Pt already checked in for appt. She is aware and okay to see Dr Braga in the absence of Dr Knight today for emergency leave.    Ann Martinez COA 10:29 AM April 18, 2018

## 2018-05-09 ENCOUNTER — OFFICE VISIT (OUTPATIENT)
Dept: OPHTHALMOLOGY | Facility: CLINIC | Age: 58
End: 2018-05-09
Attending: OPHTHALMOLOGY
Payer: COMMERCIAL

## 2018-05-09 DIAGNOSIS — Z96.1 PSEUDOPHAKIA OF BOTH EYES: ICD-10-CM

## 2018-05-09 DIAGNOSIS — H04.123 DRY EYES: Primary | ICD-10-CM

## 2018-05-09 PROCEDURE — G0463 HOSPITAL OUTPT CLINIC VISIT: HCPCS | Mod: ZF

## 2018-05-09 PROCEDURE — 92015 DETERMINE REFRACTIVE STATE: CPT | Mod: ZF

## 2018-05-09 ASSESSMENT — REFRACTION_WEARINGRX
OS_ADD: +1.75
OS_AXIS: 079
OD_SPHERE: -6.25
SPECS_TYPE: PAL
OD_ADD: +1.75
OD_CYLINDER: +1.50
OS_CYLINDER: +0.75
OD_AXIS: 108
OS_SPHERE: -6.25

## 2018-05-09 ASSESSMENT — VISUAL ACUITY
OD_SC: 20/15
OS_SC: 20/15
OS_SC+: -2
METHOD: SNELLEN - LINEAR

## 2018-05-09 ASSESSMENT — CUP TO DISC RATIO
OS_RATIO: 0.3
OD_RATIO: 0.35

## 2018-05-09 ASSESSMENT — REFRACTION_MANIFEST
OS_SPHERE: -0.75
OD_CYLINDER: SPHERE
OS_AXIS: 171
OS_SPHERE: +1.75
OD_SPHERE: +2.75
OD_ADD: +2.50
OD_CYLINDER: SPHERE
OS_CYLINDER: +0.50
OD_SPHERE: +0.25
OS_AXIS: 171
OS_CYLINDER: +0.50
OS_ADD: +2.50

## 2018-05-09 ASSESSMENT — EXTERNAL EXAM - LEFT EYE: OS_EXAM: NORMAL

## 2018-05-09 ASSESSMENT — TONOMETRY
OS_IOP_MMHG: 12
IOP_METHOD: ICARE
OD_IOP_MMHG: 14

## 2018-05-09 ASSESSMENT — EXTERNAL EXAM - RIGHT EYE: OD_EXAM: NORMAL

## 2018-05-09 NOTE — NURSING NOTE
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 States that she is doing well.  [facility] is great since surgery.  No c/o comfort BE.  Kelly BANUELOS 9:42 AM May 9, 2018

## 2018-05-09 NOTE — MR AVS SNAPSHOT
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      Who to contact     Please call your clinic at [phone] to:    Ask questions about your health    Make or cancel appointments    Discuss your medicines    Learn about your test results    Speak to your doctor            Additional Information About Your Visit        MyChart Information     C[employer] Techn[employer]iest is an electronic gateway that provides easy, online access to your medical records. With Cinario, you can request a clinic appointment, read your test results, renew a prescription or communicate with your care team.     To sign up for [employer] visit the website at [email or web address]   You will be asked to enter the access code listed below, as well as some personal information. Please follow the directions to create your username and password.     Your access code is: [email or web address]  Expires: 6/10/2018  6:30 AM     Your access code will  in 90 days. If you need help or a new code, please contact your [facility] or call [phone] for assistance.        Care EveryWhere ID     This is your Care EveryWhere ID. This could be used by other organizations to access your [place] medical records  [id number]        Your Vitals Were     Last Period                   (LMP Unknown)            Blood Pressure from Last 3 Encounters:   18 118/75   18 113/80   08 100/64    Weight from Last 3 Encounters:   18 61.5 kg (135 lb 8 oz)   18 62.2 kg (137 lb 1.6 oz)   08 84.1 kg  (185 lb 6 oz)              Today, you had the following     No orders found for display       Primary Care Provider Office Phone # Fax #    AGUILA Irene [facility] [phone]       [address] [place] [address]        Equal Access to Services     MIKETWAN EMI : Hadii aad ku hadvineeto Soomaali, waaxda luqadaha, qaybta kaalmada adeegyada, waxevelia thorpein haycarinan adecésar garrettgatito marcial. So [facility] [place] [phone].    ATENCIÓN: Si habla español, tiene a willis disposición servicios gratuitos de asistencia lingüística. Llame al [phone].    We comply with applicable federal civil rights laws and [place] laws. We do not discriminate on the basis of race, color, national origin, age, disability, sex, sexual orientation, or gender identity.            Thank you!     Thank you for choosing EYE CLINIC  for your care. Our goal is always to provide you with excellent care. Hearing back from our patients is one way we can continue to improve our services. Please take a few minutes to complete the written survey that you may receive in the mail after your visit with us. Thank you!             Your Updated Medication List - Protect others around you: Learn how to safely use, store and throw away your medicines at www.disposemymeds.org.          This list is accurate as of 5/9/18 10:44 AM.  Always use your most recent med list.                   Brand Name Dispense Instructions for use Diagnosis    MAGNESIUM PO      Take 1 Dose by mouth daily        NASACORT AQ 55 MCG/ACT Inhaler   Generic drug:  triamcinolone     1    2 puff each nostril QD (Once per day)    Allergic rhinitis, cause unspecified       OMEGA 3 PO      Take 9 mg by mouth daily        PROAIR  (90 Base) MCG/ACT Inhaler   Generic drug:  albuterol     1    INHALE 1 TO 2 PUFFS EVERY 4 TO 6 HOURS AS NEEDED    Mild intermittent asthma       PULMICORT FLEXHALER 180 MCG/ACT inhaler   Generic drug:  budesonide     1    in inhalation bid    Mild intermittent  asthma       VITAMIN B-12 PO      Take 1 tablet by mouth daily        VITAMIN D (CHOLECALCIFEROL) PO      Take 1 tablet by mouth daily

## 2019-02-22 ENCOUNTER — TELEPHONE (OUTPATIENT)
Dept: FAMILY MEDICINE | Facility: CLINIC | Age: 59
End: 2019-02-22

## 2019-02-22 NOTE — TELEPHONE ENCOUNTER
Called patient to schedule an annual exam and address health maintenance items due. LVM with name and callback number.     Health Maintenance Due   Topic Date Due     PREVENTIVE CARE VISIT  1960     FOOT EXAM Q1 YEAR  08/05/1961     MICROALBUMIN Q1 YEAR  [birth year]05/1961     HIV SCREEN (SYSTEM ASSIGNED)  08/05/1978     HEPATITIS C SCREENING  08/05/1978     PAP SCREENING Q3 YR (SYSTEM ASSIGNED)  08/05/1981     DTAP/TDAP/TD IMMUNIZATION (1 - Tdap) 08/05/1985     MAMMO SCREEN Q2 YR (SYSTEM ASSIGNED)  08/05/2000     CREATININE Q1 YEAR  07/09/2006     A1C Q3 MO  01/15/2008     LIPID MONITORING Q3 MO  08/13/2008     TSH W/ FREE T4 REFLEX Q2 YEAR  10/15/2009     COLON CANCER SCREEN (SYSTEM ASSIGNED)  08/05/2010     ZOSTER IMMUNIZATION (1 of 2) 08/05/2010     ADVANCE DIRECTIVE PLANNING Q5 YRS  08/05/2015     INFLUENZA VACCINE (1) 09/01/2018

## 2022-05-11 NOTE — PROGRESS NOTES
Assessment / Plan:    Emily Mcdermott is a 57 year old female who is 1 month s/p cataract extraction with intraocular lens placement OS, 4 month s/p cataract extraction with intraocular lens placement OD    Doing great, patient very happy.  Minimonovision target in OS: patient very happy    1) Pseudophakia both eyes   - doing great   -computer glasses dispensed today    Return to clinic in approximately 1 year, DFE OU    Alton Rosario MD  PGY3, Dept of Ophthalmology  Pager [phone]    ~~~~~~~~~~~~~~~~~~~~~~~~~~~~~~~~~~~~~~~~~~~~~~~~~~~~~~~~~~~~~~~~    Complete documentation of historical and exam elements from today's encounter can be found in the full encounter summary report (not reduplicated in this progress note). I personally obtained the chief complaint(s) and history of present illness.  I confirmed and edited as necessary the review of systems, past medical/surgical history, family history, social history, and examination findings as documented by others; and I examined the patient myself. I personally reviewed the relevant tests, images, and reports as documented above. I formulated and edited as necessary the assessment and plan and discussed the findings and management plan with the patient and family.    Jay Knight MD     No

## 2022-08-17 ASSESSMENT — ASTHMA QUESTIONNAIRES
QUESTION_4 LAST FOUR WEEKS HOW OFTEN HAVE YOU USED YOUR RESCUE INHALER OR NEBULIZER MEDICATION (SUCH AS ALBUTEROL): NOT AT ALL
QUESTION_2 LAST FOUR WEEKS HOW OFTEN HAVE YOU HAD SHORTNESS OF BREATH: NOT AT ALL
ACT_TOTALSCORE: 24
QUESTION_5 LAST FOUR WEEKS HOW WOULD YOU RATE YOUR ASTHMA CONTROL: COMPLETELY CONTROLLED
ACT_TOTALSCORE: 24
QUESTION_3 LAST FOUR WEEKS HOW OFTEN DID YOUR ASTHMA SYMPTOMS (WHEEZING, COUGHING, SHORTNESS OF BREATH, CHEST TIGHTNESS OR PAIN) WAKE YOU UP AT NIGHT OR EARLIER THAN USUAL IN THE MORNING: ONCE OR TWICE
QUESTION_1 LAST FOUR WEEKS HOW MUCH OF THE TIME DID YOUR ASTHMA KEEP YOU FROM GETTING AS MUCH DONE AT WORK, SCHOOL OR AT HOME: NONE OF THE TIME

## 2022-08-17 ASSESSMENT — PATIENT HEALTH QUESTIONNAIRE - PHQ9
SUM OF ALL RESPONSES TO PHQ QUESTIONS 1-9: 21
10. IF YOU CHECKED OFF ANY PROBLEMS, HOW DIFFICULT HAVE THESE PROBLEMS MADE IT FOR YOU TO DO YOUR WORK, TAKE CARE OF THINGS AT HOME, OR GET ALONG WITH OTHER PEOPLE: EXTREMELY DIFFICULT
SUM OF ALL RESPONSES TO PHQ QUESTIONS 1-9: 21

## 2022-08-24 ENCOUNTER — OFFICE VISIT (OUTPATIENT)
Dept: FAMILY MEDICINE | Facility: CLINIC | Age: 62
End: 2022-08-24
Payer: COMMERCIAL

## 2022-08-24 VITALS
HEART RATE: 77 BPM | TEMPERATURE: 97.7 F | DIASTOLIC BLOOD PRESSURE: 79 MMHG | WEIGHT: 155.8 LBS | RESPIRATION RATE: 16 BRPM | SYSTOLIC BLOOD PRESSURE: 134 MMHG | OXYGEN SATURATION: 99 % | BODY MASS INDEX: 26.17 KG/M2

## 2022-08-24 DIAGNOSIS — E11.9 TYPE 2 DIABETES, HBA1C GOAL < 7% (H): Primary | ICD-10-CM

## 2022-08-24 DIAGNOSIS — J45.20 MILD INTERMITTENT ASTHMA, UNSPECIFIED WHETHER COMPLICATED: ICD-10-CM

## 2022-08-24 DIAGNOSIS — R30.0 DYSURIA: ICD-10-CM

## 2022-08-24 DIAGNOSIS — M79.671 RIGHT FOOT PAIN: ICD-10-CM

## 2022-08-24 DIAGNOSIS — R53.83 FATIGUE, UNSPECIFIED TYPE: ICD-10-CM

## 2022-08-24 DIAGNOSIS — E78.5 HYPERLIPIDEMIA LDL GOAL <100: ICD-10-CM

## 2022-08-24 LAB
ALBUMIN SERPL-MCNC: 3.6 G/DL (ref 3.4–5)
ALBUMIN UR-MCNC: NEGATIVE MG/DL
ALP SERPL-CCNC: 124 U/L (ref 40–150)
ALT SERPL W P-5'-P-CCNC: 31 U/L (ref 0–50)
ANION GAP SERPL CALCULATED.3IONS-SCNC: 9 MMOL/L (ref 3–14)
APPEARANCE UR: CLEAR
AST SERPL W P-5'-P-CCNC: 23 U/L (ref 0–45)
BILIRUB SERPL-MCNC: 0.4 MG/DL (ref 0.2–1.3)
BILIRUB UR QL STRIP: NEGATIVE
BUN SERPL-MCNC: 14 MG/DL (ref 7–30)
CALCIUM SERPL-MCNC: 9.5 MG/DL (ref 8.5–10.1)
CHLORIDE BLD-SCNC: 106 MMOL/L (ref 94–109)
CHOLEST SERPL-MCNC: 143 MG/DL
CO2 SERPL-SCNC: 24 MMOL/L (ref 20–32)
COLOR UR AUTO: YELLOW
CREAT SERPL-MCNC: 0.83 MG/DL (ref 0.52–1.04)
CREAT UR-MCNC: 18 MG/DL
ERYTHROCYTE [DISTWIDTH] IN BLOOD BY AUTOMATED COUNT: 12.4 % (ref 10–15)
FASTING STATUS PATIENT QL REPORTED: NO
GFR SERPL CREATININE-BSD FRML MDRD: 79 ML/MIN/1.73M2
GLUCOSE BLD-MCNC: 124 MG/DL (ref 70–99)
GLUCOSE UR STRIP-MCNC: NEGATIVE MG/DL
HBA1C MFR BLD: 10.2 % (ref 0–5.6)
HCT VFR BLD AUTO: 34.5 % (ref 35–47)
HDLC SERPL-MCNC: 53 MG/DL
HGB BLD-MCNC: 11.4 G/DL (ref 11.7–15.7)
HGB UR QL STRIP: NEGATIVE
KETONES UR STRIP-MCNC: NEGATIVE MG/DL
LDLC SERPL CALC-MCNC: 75 MG/DL
LEUKOCYTE ESTERASE UR QL STRIP: NEGATIVE
MCH RBC QN AUTO: 29.8 PG (ref 26.5–33)
MCHC RBC AUTO-ENTMCNC: 33 G/DL (ref 31.5–36.5)
MCV RBC AUTO: 90 FL (ref 78–100)
MICROALBUMIN UR-MCNC: <5 MG/L
MICROALBUMIN/CREAT UR: NORMAL MG/G{CREAT}
NITRATE UR QL: NEGATIVE
NONHDLC SERPL-MCNC: 90 MG/DL
PH UR STRIP: 6 [PH] (ref 5–7)
PLATELET # BLD AUTO: 262 10E3/UL (ref 150–450)
POTASSIUM BLD-SCNC: 4.1 MMOL/L (ref 3.4–5.3)
PROT SERPL-MCNC: 7.3 G/DL (ref 6.8–8.8)
RBC # BLD AUTO: 3.83 10E6/UL (ref 3.8–5.2)
RBC #/AREA URNS AUTO: NORMAL /HPF
SODIUM SERPL-SCNC: 139 MMOL/L (ref 133–144)
SP GR UR STRIP: 1.01 (ref 1–1.03)
TRIGL SERPL-MCNC: 76 MG/DL
TSH SERPL DL<=0.005 MIU/L-ACNC: 3.37 MU/L (ref 0.4–4)
UROBILINOGEN UR STRIP-ACNC: 0.2 E.U./DL
WBC # BLD AUTO: 7.1 10E3/UL (ref 4–11)
WBC #/AREA URNS AUTO: NORMAL /HPF

## 2022-08-24 PROCEDURE — 84443 ASSAY THYROID STIM HORMONE: CPT | Performed by: FAMILY MEDICINE

## 2022-08-24 PROCEDURE — 80061 LIPID PANEL: CPT | Performed by: FAMILY MEDICINE

## 2022-08-24 PROCEDURE — 36415 COLL VENOUS BLD VENIPUNCTURE: CPT | Performed by: FAMILY MEDICINE

## 2022-08-24 PROCEDURE — 81001 URINALYSIS AUTO W/SCOPE: CPT | Performed by: FAMILY MEDICINE

## 2022-08-24 PROCEDURE — 99204 OFFICE O/P NEW MOD 45 MIN: CPT | Performed by: FAMILY MEDICINE

## 2022-08-24 PROCEDURE — 80053 COMPREHEN METABOLIC PANEL: CPT | Performed by: FAMILY MEDICINE

## 2022-08-24 PROCEDURE — 83036 HEMOGLOBIN GLYCOSYLATED A1C: CPT | Performed by: FAMILY MEDICINE

## 2022-08-24 PROCEDURE — 82043 UR ALBUMIN QUANTITATIVE: CPT | Performed by: FAMILY MEDICINE

## 2022-08-24 PROCEDURE — 85027 COMPLETE CBC AUTOMATED: CPT | Performed by: FAMILY MEDICINE

## 2022-08-24 RX ORDER — ATORVASTATIN CALCIUM 20 MG/1
1 TABLET, FILM COATED ORAL DAILY
COMMUNITY
Start: 2022-04-21 | End: 2023-05-04

## 2022-08-24 RX ORDER — ALBUTEROL SULFATE 90 UG/1
2 AEROSOL, METERED RESPIRATORY (INHALATION) EVERY 6 HOURS
Qty: 18 G | Refills: 3 | Status: SHIPPED | OUTPATIENT
Start: 2022-08-24

## 2022-08-24 ASSESSMENT — PATIENT HEALTH QUESTIONNAIRE - PHQ9
SUM OF ALL RESPONSES TO PHQ QUESTIONS 1-9: 21
10. IF YOU CHECKED OFF ANY PROBLEMS, HOW DIFFICULT HAVE THESE PROBLEMS MADE IT FOR YOU TO DO YOUR WORK, TAKE CARE OF THINGS AT HOME, OR GET ALONG WITH OTHER PEOPLE: EXTREMELY DIFFICULT

## 2022-08-24 ASSESSMENT — PAIN SCALES - GENERAL: PAINLEVEL: NO PAIN (0)

## 2022-08-24 NOTE — PROGRESS NOTES
Assessment & Plan     TYPE 2 DIABETES, HBA1C GOAL < 7%  New patient , diabetic , no recent visits for diabetes management , we discussed checking her labs today  ,also referral for eye exam and MTM placed today , her HGb A1 c is high at 10.2 , she is taking insulin glargine at 14 units daily and states that when she attempts to increase the dose even smaller increase dgive her side effects , wondering about medications, she is not sure if she has type 1 ot type 2 , has been diagnosed over 20 yrs ago   - Lipid panel reflex to direct LDL Non-fasting; Future  - Albumin Random Urine Quantitative with Creat Ratio; Future  - REVIEW OF HEALTH MAINTENANCE PROTOCOL ORDERS  - HEMOGLOBIN A1C; Future  - Adult Eye  Referral; Future  - Comprehensive metabolic panel (BMP + Alb, Alk Phos, ALT, AST, Total. Bili, TP); Future  - Orthopedic  Referral; Future  - Med Therapy Management Referral    Screening for hyperlipidemia  Lipids are within goal today   - Lipid panel reflex to direct LDL Non-fasting; Future    Fatigue, unspecified type  Suspect that fatigue is mostly related to the high glucose levels   - TSH with free T4 reflex; Future  - CBC with platelets; Future    Dysuria  No signs of UTI but more frequent voiding with the hyperlglycemia   - UA with Microscopic reflex to Culture - lab collect; Future    Right foot pain  She has pain and defority in the right foot which is getting worse , also needs a feet check as uncontrolled DM type 2   - Orthopedic  Referral; Future       Depression Screening Follow Up    PHQ 8/17/2022   PHQ-9 Total Score 21   Q9: Thoughts of better off dead/self-harm past 2 weeks Not at all     Last PHQ-9 8/17/2022   1.  Little interest or pleasure in doing things 3   2.  Feeling down, depressed, or hopeless 3   3.  Trouble falling or staying asleep, or sleeping too much 3   4.  Feeling tired or having little energy 3   5.  Poor appetite or overeating 3   6.  Feeling bad about  yourself 2   7.  Trouble concentrating 2   8.  Moving slowly or restless 2   Q9: Thoughts of better off dead/self-harm past 2 weeks 0   PHQ-9 Total Score 21   Difficulty at work, home, or with people -       Follow Up Actions Taken   f/u with NORRIS Garnica, with me in 2 to 3 weeks , with eye doc, podiatry .        Felicia Avalos MD  [facility]    Daniel Diaz is a 62 year old, presenting for the following health issues:  Diabetes      History of Present Illness       Reason for visit:  Diabetes, foot, vision, sadness, fatigue, sleep, sweats  Symptom onset:  More than a month  Symptoms include:  Foot and muscle pain, vision loss, fatigue, sadness, extensive sweating,  Symptom intensity:  Severe  Symptom progression:  Worsening  Had these symptoms before:  Yes  Has tried/received treatment for these symptoms:  Yes  Previous treatment was successful:  No  What makes it worse:  Sugar, not being able to see, losing my balance...  What makes it better:  Exercise, good food, good vision, gardening, nature    She eats 4 or more servings of fruits and vegetables daily.She consumes 0 sweetened beverage(s) daily.She exercises with enough effort to increase her heart rate 20 to 29 minutes per day.  She exercises with enough effort to increase her heart rate 5 days per week.   She is taking medications regularly.    Today's PHQ-9         PHQ-9 Total Score: 21    PHQ-9 Q9 Thoughts of better off dead/self-harm past 2 weeks :   Not at all    How difficult have these problems made it for you to do your work, take care of things at home, or get along with other people: Extremely difficult   fasting glucose is around 50s and 60s in the am , variability is great for the sugars , if she is stressed out then she has some highs , in the 200s   Recently is more stressed out , vision is bad   Insulin and for many yrs , she was diagnosed ( first gestational Diabetes 26 yrs ago ) ,   At least twenty yrs not sure if  it is type 1 or type 2   Brief period of time on Metformin but now is on Glargine 14 units a day if she increased the dose she has joint aches , careful with diet and she is vegan over 20 yrs   2) major fatigue now she started eating sardines and eggs recently   She has felt fatigued for 6 months on and off but pronounced now in the past month or so , numbness in her feet for yrs   3) right foot deformity calcium in the bone on the arch painful   Review of Systems   Constitutional, HEENT, cardiovascular, pulmonary, GI, , musculoskeletal, neuro, skin, endocrine and psych systems are negative, except as otherwise noted.      Objective    LMP  (LMP Unknown)   There is no height or weight on file to calculate BMI.  Physical Exam   GENERAL: healthy, alert and no distress  EYES: Eyes grossly normal to inspection, PERRL and conjunctivae and sclerae normal  HENT: ear canals and TM's normal, nose and mouth without ulcers or lesions  NECK: no adenopathy, no asymmetry, masses, or scars and thyroid normal to palpation  RESP: lungs clear to auscultation - no rales, rhonchi or wheezes  CV: regular rate and rhythm, normal S1 S2, no S3 or S4, no murmur, click or rub, no peripheral edema and peripheral pulses strong  ABDOMEN: soft, nontender, no hepatosplenomegaly, no masses and bowel sounds normal  MS: no gross musculoskeletal defects noted, no edema EXCEPT right foot deformity on the medial aspect ,some edema in the foot and ankle but not pretibial  SKIN: no suspicious lesions or rashes  NEURO: Normal strength and tone, mentation intact and speech normal    Results for orders placed or performed in visit on 08/24/22   Lipid panel reflex to direct LDL Non-fasting     Status: None   Result Value Ref Range    Cholesterol 143 <200 mg/dL    Triglycerides 76 <150 mg/dL    Direct Measure HDL 53 >=50 mg/dL    LDL Cholesterol Calculated 75 <=100 mg/dL    Non HDL Cholesterol 90 <130 mg/dL    Patient Fasting > 8hrs? No     Narrative     Cholesterol  Desirable:  <200 mg/dL    Triglycerides  Normal:  Less than 150 mg/dL  Borderline High:  150-199 mg/dL  High:  200-499 mg/dL  Very High:  Greater than or equal to 500 mg/dL    Direct Measure HDL  Female:  Greater than or equal to 50 mg/dL   Male:  Greater than or equal to 40 mg/dL    LDL Cholesterol  Desirable:  <100mg/dL  Above Desirable:  100-129 mg/dL   Borderline High:  130-159 mg/dL   High:  160-189 mg/dL   Very High:  >= 190 mg/dL    Non HDL Cholesterol  Desirable:  130 mg/dL  Above Desirable:  130-159 mg/dL  Borderline High:  160-189 mg/dL  High:  190-219 mg/dL  Very High:  Greater than or equal to 220 mg/dL   Albumin Random Urine Quantitative with Creat Ratio     Status: None   Result Value Ref Range    Creatinine Urine mg/dL 18 mg/dL    Albumin Urine mg/L <5 mg/L    Albumin Urine mg/g Cr     HEMOGLOBIN A1C     Status: Abnormal   Result Value Ref Range    Hemoglobin A1C 10.2 (H) 0.0 - 5.6 %   Comprehensive metabolic panel (BMP + Alb, Alk Phos, ALT, AST, Total. Bili, TP)     Status: Abnormal   Result Value Ref Range    Sodium 139 133 - 144 mmol/L    Potassium 4.1 3.4 - 5.3 mmol/L    Chloride 106 94 - 109 mmol/L    Carbon Dioxide (CO2) 24 20 - 32 mmol/L    Anion Gap 9 3 - 14 mmol/L    Urea Nitrogen 14 7 - 30 mg/dL    Creatinine 0.83 0.52 - 1.04 mg/dL    Calcium 9.5 8.5 - 10.1 mg/dL    Glucose 124 (H) 70 - 99 mg/dL    Alkaline Phosphatase 124 40 - 150 U/L    AST 23 0 - 45 U/L    ALT 31 0 - 50 U/L    Protein Total 7.3 6.8 - 8.8 g/dL    Albumin 3.6 3.4 - 5.0 g/dL    Bilirubin Total 0.4 0.2 - 1.3 mg/dL    GFR Estimate 79 >60 mL/min/1.73m2   TSH with free T4 reflex     Status: Normal   Result Value Ref Range    TSH 3.37 0.40 - 4.00 mU/L   CBC with platelets     Status: Abnormal   Result Value Ref Range    WBC Count 7.1 4.0 - 11.0 10e3/uL    RBC Count 3.83 3.80 - 5.20 10e6/uL    Hemoglobin 11.4 (L) 11.7 - 15.7 g/dL    Hematocrit 34.5 (L) 35.0 - 47.0 %    MCV 90 78 - 100 fL    MCH 29.8 26.5 - 33.0 pg     MCHC 33.0 31.5 - 36.5 g/dL    RDW 12.4 10.0 - 15.0 %    Platelet Count 262 150 - 450 10e3/uL   UA with Microscopic reflex to Culture - lab collect     Status: Normal    Specimen: Urine, Clean Catch   Result Value Ref Range    Color Urine Yellow Colorless, Straw, Light Yellow, Yellow    Appearance Urine Clear Clear    Glucose Urine Negative Negative mg/dL    Bilirubin Urine Negative Negative    Ketones Urine Negative Negative mg/dL    Specific Gravity Urine 1.010 1.003 - 1.035    Blood Urine Negative Negative    pH Urine 6.0 5.0 - 7.0    Protein Albumin Urine Negative Negative mg/dL    Urobilinogen Urine 0.2 0.2, 1.0 E.U./dL    Nitrite Urine Negative Negative    Leukocyte Esterase Urine Negative Negative   Urine Microscopic     Status: Normal   Result Value Ref Range    RBC Urine 0-2 0-2 /HPF /HPF    WBC Urine 0-5 0-5 /HPF /HPF    Narrative    Urine Culture not indicated                   .  ..

## 2022-08-25 PROBLEM — E78.5 HYPERLIPIDEMIA LDL GOAL <100: Status: ACTIVE | Noted: 2022-08-25

## 2022-08-26 ENCOUNTER — OFFICE VISIT (OUTPATIENT)
Dept: PODIATRY | Facility: CLINIC | Age: 62
End: 2022-08-26

## 2022-08-26 ENCOUNTER — ANCILLARY PROCEDURE (OUTPATIENT)
Dept: GENERAL RADIOLOGY | Facility: CLINIC | Age: 62
End: 2022-08-26
Attending: PODIATRIST
Payer: COMMERCIAL

## 2022-08-26 VITALS
SYSTOLIC BLOOD PRESSURE: 128 MMHG | BODY MASS INDEX: 25.92 KG/M2 | WEIGHT: 155.6 LBS | HEIGHT: 65 IN | DIASTOLIC BLOOD PRESSURE: 78 MMHG

## 2022-08-26 DIAGNOSIS — M14.671 CHARCOT'S JOINT OF RIGHT FOOT: Primary | ICD-10-CM

## 2022-08-26 DIAGNOSIS — E11.49 OTHER DIABETIC NEUROLOGICAL COMPLICATION ASSOCIATED WITH TYPE 2 DIABETES MELLITUS (H): ICD-10-CM

## 2022-08-26 DIAGNOSIS — M79.671 RIGHT FOOT PAIN: ICD-10-CM

## 2022-08-26 DIAGNOSIS — E11.65 UNCONTROLLED TYPE 2 DIABETES MELLITUS WITH HYPERGLYCEMIA (H): ICD-10-CM

## 2022-08-26 PROCEDURE — 99203 OFFICE O/P NEW LOW 30 MIN: CPT | Performed by: PODIATRIST

## 2022-08-26 PROCEDURE — 73630 X-RAY EXAM OF FOOT: CPT | Mod: TC | Performed by: RADIOLOGY

## 2022-08-26 NOTE — PROGRESS NOTES
"Assessment:      ICD-10-CM    1. Charcot's joint of right foot  M14.671 XR Foot Right G/E 3 Views     [facility] Other (Comments)   2. Uncontrolled type 2 diabetes mellitus with hyperglycemia (H)  E11.65 [facility] Other (Comments)   3. Other diabetic neurological complication associated with type 2 diabetes mellitus (H)  E11.49 [facility] Other (Comments)          Plan:  Orders Placed This Encounter   Procedures     XR Foot Right G/E 3 Views     [facility] Other (Comments)         Discussed the etiology and treatment of the condition with the patient.  Discussed surgical and conservative options.    -Charcot midfoot    Uncontrolled DM 2    -Orthotics referral  Midfoot charcot largely non-srugical, unlike ankle Charcot.    PCP for DM control      Return:  No follow-ups on file.     Sharifa Yi DPM                  Chief Complaint:     Patient presents with:  Right Foot - Pain       HPI:  Emily Mcdermott is a 62 year old year old female who presents for Diabetic Foot complication.    She states she has noticed a \"deformed\" right foot, hammertoes are forming. Has been worsening over the past 6-8 months.   Also notices swelling and edema in her foot over the past 6-12 months.   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   10/15/2007 7.4 @ <7 %      Comment:     Testing performed by [facility]             Past Medical & Surgical History:  Past Medical History:   Diagnosis Date     Allergic rhinitis, " "cause unspecified     Allergic rhinitis     Mild intermittent asthma     Asymptomatic for 6 years post Vegan diet     Type II or unspecified type diabetes mellitus without mention of complication, not stated as uncontrolled 2004    Followed with [facility] previously. Not currently on medications for DM      Past Surgical History:   Procedure Laterality Date     HYSTERECTOMY, VAGINAL  1998     SURGICAL HISTORY OF -       nasal surgery for chronic sinusitis      Family History   Problem Relation Age of Onset     Glaucoma No family hx of      Macular Degeneration No family hx of         Social History:  ?  History   Smoking Status     Former Smoker     Quit date: 1982   Smokeless Tobacco     Former User     Quit date: 1982     History   Drug Use Not on file     Social History    Substance and Sexual Activity      Alcohol use: Not on file      Allergies:  ?   Allergies   Allergen Reactions     Pcn [Penicillins]         Medications:    Current Outpatient Medications   Medication     albuterol (PROAIR HFA/PROVENTIL HFA/VENTOLIN HFA) 108 (90 Base) MCG/ACT inhaler     atorvastatin (LIPITOR) 20 MG tablet     insulin glargine U-300 (TOUJEO) 300 UNIT/ML (1 units dial) pen     melatonin 5 MG tablet     Omega-3 Fatty Acids (OMEGA 3 PO)     PROAIR  (90 BASE) MCG/ACT IN AERS     vitamin B complex with vitamin C (STRESS TAB) tablet     VITAMIN D, CHOLECALCIFEROL, PO     No current facility-administered medications for this visit.       Physical Exam:  ?  Vitals:  /78   Ht 1.643 m (5' 4.7\")   Wt 70.6 kg (155 lb 9.6 oz)   LMP  (LMP Unknown)   BMI 26.13 kg/m     General:  WD/WN, in NAD.  A&O x3.  Dermatologic:    Skin is intact, open lesions absent.   Skin texture, turgor is normal.  Vascular:  Pulses palpable bilateral.  Digital capillary refill time normal bilateral.  Skin temperature is normal right.  Generalized edema- none bilateral.  Focal edema- moderate midfoot right.  Neurologic:    Protective " sensation diminished  Gross sensation normal.  Gait and balance abnormal, poor balance.  Musculoskeletal:  No pain to palpation of foot & ankle  Midfoot rocker bottom, R  Muscle strength 5/5 foot & ankle      Stance:  Severe midfoot collapse R      Imaging  x-ray independently reviewed and interpreted by myself today.  Weight-bearing views right foot dated 08/26/22, reveal midfoot Charcot at Lisfranc articulation w/ complete loss of 1st TMTJ & plantarly prominent medial cuneiform

## 2022-08-26 NOTE — PATIENT INSTRUCTIONS
PATIENT INSTRUCTIONS - Podiatry / Foot & Ankle Surgery        [facility]  [facility]  [address]  [place] [address]  Phone: [phone]  Fax: [phone] [facility]  [address]  [place] [address]  Phone: [phone]   Fax: [phone]   [facility]  [address]  [place] [address]  Phone: [phone]  Fax: [phone] [facility]  [address]  [place] [address]  Phone: [phone]   Fax: [phone]   [facility]   [address]  [place] [address]  Phone: [phone]  Fax: [phone] * Please call any location listed to make an appointment for a casting/fitting. Your referral was sent to their central office and they will all have the order on file.         Follow-up with PCP for improvement of HbA1c and neuropathy symptoms

## 2022-09-13 ENCOUNTER — OFFICE VISIT (OUTPATIENT)
Dept: PHARMACY | Facility: CLINIC | Age: 62
End: 2022-09-13
Payer: COMMERCIAL

## 2022-09-13 VITALS — SYSTOLIC BLOOD PRESSURE: 138 MMHG | DIASTOLIC BLOOD PRESSURE: 82 MMHG

## 2022-09-13 DIAGNOSIS — Z13.31 POSITIVE SCREENING FOR DEPRESSION ON 2-ITEM PATIENT HEALTH QUESTIONNAIRE (PHQ-2): ICD-10-CM

## 2022-09-13 DIAGNOSIS — E11.9 TYPE 2 DIABETES MELLITUS WITHOUT COMPLICATION, WITH LONG-TERM CURRENT USE OF INSULIN (H): Primary | ICD-10-CM

## 2022-09-13 DIAGNOSIS — Z79.4 TYPE 2 DIABETES MELLITUS WITHOUT COMPLICATION, WITH LONG-TERM CURRENT USE OF INSULIN (H): Primary | ICD-10-CM

## 2022-09-13 DIAGNOSIS — Z78.9 TAKES DIETARY SUPPLEMENTS: ICD-10-CM

## 2022-09-13 DIAGNOSIS — E78.5 HYPERLIPIDEMIA LDL GOAL <100: ICD-10-CM

## 2022-09-13 PROCEDURE — 99607 MTMS BY PHARM ADDL 15 MIN: CPT | Performed by: PHARMACIST

## 2022-09-13 PROCEDURE — 99605 MTMS BY PHARM NP 15 MIN: CPT | Performed by: PHARMACIST

## 2022-09-13 NOTE — PATIENT INSTRUCTIONS
"Recommendations from today's MTM visit:                                                    MTM (medication therapy management) is a service provided by a clinical pharmacist designed to help you get the most of out of your medicines.   Today we reviewed what your medicines are for, how to know if they are working, that your medicines are safe and how to make your medicine regimen as easy as possible.      1. I sent in the Ticket [employer] 2 Sensors to [employer] for you. You will need a new manuel to make this work, I'll MyChart you to the information about this.     2. I'm going to work with your insurance to see if we can get Jardiance 10mg once daily covered for you.     3. Decrease Toujeo to 12 units once a day.     Follow-up: I'll be in touch over [employer].     It was great speaking with you today.  I value your experience and would be very thankful for your time in providing feedback in our clinic survey. In the next few days, you may receive an email or text message from [employer] with a link to a survey related to your  clinical pharmacist.\"     To schedule another MTM appointment, please call the clinic directly or you may call the MTM scheduling line at [phone] or toll-free at [phone].     My Clinical Pharmacist's contact information:                                                      Please feel free to contact me with any questions or concerns you have.      Fariha Oropeza, Pharm.D., M.B.A., [facility] Hospital[facility]  MTM Pharmacist, [facility]  Pager: [phone]  Email: [email or web address]    "

## 2022-09-13 NOTE — PROGRESS NOTES
Medication Therapy Management (MTM) Encounter    ASSESSMENT:                            Medication Adherence/Access: No issues identified    Type 2 DM: Not meeting  A1C goal of <7%. Discussed available options - she didn't feel like metformin was helpful, doesn't want to experience SE of nausea (avoid GLP1 for now), would prefer to avoid weight gain (SFU and TZD, mealtime insulin). She has had success with SGLT2i in the past and has different insurance now. She would benefit from using a CGM to help with glucose management. From the sensor readings she has her nightsweats may be due to her rapid decline in blood sugars (although there is no hypoglycemia noted).   She is on a statin, UTD with foot exams with eye exam scheduled, prefers not to take aspirin (primary prevention) and is normotensive with UACR at goal (no need for ACE/ARB). Flu and COVID vaccines due.     Hyperlipidemia: LDL-C is at goal of <100 and she is tolerating statin therapy.     Supplements: Stable - tolerating without side effects.     Mental Health: Will have her repeat PHQ9 and monitor closely. She does not feel intervention is needed today, but offered [facility] referral if she would like.       PLAN:                            1. I sent in the Rene 2 Sensors to [employer] for you. You will need a new manuel to make this work, I'll MyChart you to the information about this.     2. I'm going to work with your insurance to see if we can get Jardiance 10mg once daily covered for you.     3. Decrease Toujeo to 12 units once a day.     Follow-up: I'll be in touch over MyChart.     SUBJECTIVE/OBJECTIVE:                          Emily Mcdermott is a 62 year old female coming in for an initial visit. She was referred to me from Dr. Avalos.      Reason for visit: Comprehensive medication review    Allergies/ADRs: Reviewed in chart  Past Medical History: Reviewed in chart  Tobacco: She reports that she quit smoking about 40 years ago. She quit smokeless tobacco use  about 40 years ago.  Alcohol: None in the last three months  Other Substance Use: None  Caffeine: Green Tea with Ashwghanda and Matcha    Medication Adherence/Access: no issues reported    Type 2 Diabetes:  Currently taking Toujeo 14 units daily (higher doses cause extreme/debilitating joint pain).   She also experienced similar joint discomfort with Novolog and Lantus  Has taken Metformin in the past but didn't feel like this was effective (25+ Years ago). Took a few months of Jardiance, but became cost prohibitive. Remembers this being quite effective. She would really like to avoid the feeling of nausea as she hates this side effect.   Blood sugar monitoring: Briefly had a CGM, thought this was really helpful. Patterns on her phone show sharp decrease in her blood sugars in the morning hours and then high throughout the second part of the day.   History of GDM about 26 years ago, resolved after birth. Initially dx as T2DM but then some other MDs have said type 1 or 1.5. She does not recall if she had any formal testing (I.e. SENIA antibodies). Has noticed extreme sweating at night with higher blood sugars, higher carb meals and stress. Her fatigue is improved when she skips meals. She feels like she has more energy in the morning. She denies s/sx of hyperglycemia.   Eye exam: due - was recently given referral, has this scheduled.   Foot exam: up to date - Saw Dr. Yi on 8/26  Diet/Exercise: She follows a vegan diet and has found that with good diet adherence she can control her blood sugars  Aspirin: Not taking due to preference - primary prevention and ASA causes GI upset.     Hyperlipidemia: Current therapy includes atorvastatin 20mg daily.  Patient reports no significant myalgias or other side effects.    Supplements: Currently taking a B Complex and Fish Oil daily. No side effects noted with these, but not time to fully address.     Mental Health: PHQ9 was 21 on 8/17/22. Today she states she is feeling  better and that the score would be much lower.   She has never been treated for depression. Didn't experience PPD, but did have a first episode after 9/11 for about 6 months. Family Hx of depression (her mom) and she sees some traits in her daughter.     Today's Vitals: /82   LMP  (LMP Unknown)   ----------------  I spent 60 minutes with this patient today. All changes were made via collaborative practice agreement with AGUILA Irene CNP. A copy of the visit note was provided to the patient's provider(s).    The patient was given a summary of these recommendations.     Gracie Dempsey  4th Year Pharmacy Student    Fariha Oropeza, PharmD, NAKUL, BCACP  MTM Pharmacist, [facility]      Medication Therapy Recommendations  Type 2 diabetes, HbA1c goal < 7% (H)    Current Medication: insulin glargine U-300 (TOUJEO) 300 UNIT/ML (1 units dial) pen   Rationale: Medication requires monitoring - Needs additional monitoring   Recommendation: Self-Monitoring - FreeStyle Rene 2 Sensor Misc   Status: Accepted per CPA          Current Medication: insulin glargine U-300 (TOUJEO) 300 UNIT/ML (1 units dial) pen   Rationale: Synergistic therapy - Needs additional medication therapy - Indication   Recommendation: Start Medication - Jardiance 10 MG Tabs   Status: Accepted per CPA          Current Medication: insulin glargine U-300 (TOUJEO) 300 UNIT/ML (1 units dial) pen   Rationale: Dose too high - Dosage too high - Safety   Recommendation: Decrease Dose   Status: Accepted per CPA

## 2022-10-12 DIAGNOSIS — Z04.9 DISEASE RULED OUT AFTER EXAMINATION: Primary | ICD-10-CM

## 2022-10-12 NOTE — PROGRESS NOTES
HPI:  Patient presents for a diabetic eye exam. Patient has type 2 diabetes mellitus and the last HA1C was 10.2 on 08/24/2022. Patient complains of blurry vision in both eyes over the past year.       Pertinent Medical History:    Asthma    Type 2 diabetes mellitus    Hyperlipidemia    Ocular History:    Pseudophakia, both eyes.     Eye Medications:    None    Assessment and Plan:  1.   Blurry vision is multifactorial    Due to dry eyes, posterior capsular opacification, and diabetic retinopathy.     2.   Posterior Capsular Opacification, both eyes.     Visually significant. See Dr. Andrade for evaluation.     3.   Moderate/Severe Non-proliferative Diabetic Retinopathy, left eye > right eye.     The last HA1C was 10.2 on 08/24/2022.     No diabetic macular edema but diabetic retinopathy is likely mildly visually significant even post YAG. Patient expresses understanding.     Will follow up with Dr. Andrade.    4.   Epiretinal Membrane, both eyes.     Mildly visually significant?        Patient consented to a dilated eye exam:    Yes. Side effects discussed.    Medical History:  Past Medical History:   Diagnosis Date     Allergic rhinitis, cause unspecified     Allergic rhinitis     Mild intermittent asthma     Asymptomatic for 6 years post Vegan diet     Type II or unspecified type diabetes mellitus without mention of complication, not stated as uncontrolled 2004    Followed with [facility] previously. Not currently on medications for DM       Medications:  Current Outpatient Medications   Medication Sig Dispense Refill     albuterol (PROAIR HFA/PROVENTIL HFA/VENTOLIN HFA) 108 (90 Base) MCG/ACT inhaler Inhale 2 puffs into the lungs every 6 hours 18 g 3     atorvastatin (LIPITOR) 20 MG tablet Take 1 tablet by mouth daily       Continuous Blood Gluc Sensor (FREESTYLE JAY 2 SENSOR) MISC 1 each every 14 days Use 1 sensor every 14 days. Use to read blood sugars per 's instructions. 2 each 5      empagliflozin (JARDIANCE) 10 MG TABS tablet Take 1 tablet (10 mg) by mouth daily 90 tablet 1     insulin glargine U-300 (TOUJEO) 300 UNIT/ML (1 units dial) pen Inject 14 Units Subcutaneous daily       melatonin 5 MG tablet Take 5 mg by mouth nightly as needed       Omega-3 Fatty Acids (OMEGA 3 PO) Take 900 mg by mouth daily       vitamin B complex with vitamin C (STRESS TAB) tablet Take 1 tablet by mouth daily     Complete documentation of historical and exam elements from today's encounter can be found in the full encounter summary report (not reduplicated in this progress note). I personally obtained the chief complaint(s) and history of present illness.  I confirmed and edited as necessary the review of systems, past medical/surgical history, family history, social history, and examination findings as documented by others; and I examined the patient myself. I personally reviewed the relevant tests, images, and reports as documented above. I formulated and edited as necessary the assessment and plan and discussed the findings and management plan with the patient and family. - Isabel Aviles OD

## 2022-10-13 ENCOUNTER — OFFICE VISIT (OUTPATIENT)
Dept: OPHTHALMOLOGY | Facility: CLINIC | Age: 62
End: 2022-10-13
Attending: FAMILY MEDICINE
Payer: COMMERCIAL

## 2022-10-13 DIAGNOSIS — H26.493 BILATERAL POSTERIOR CAPSULAR OPACIFICATION: ICD-10-CM

## 2022-10-13 DIAGNOSIS — E11.3493 SEVERE NONPROLIFERATIVE DIABETIC RETINOPATHY OF BOTH EYES WITHOUT MACULAR EDEMA ASSOCIATED WITH TYPE 2 DIABETES MELLITUS (H): Primary | ICD-10-CM

## 2022-10-13 DIAGNOSIS — H35.373 EPIRETINAL MEMBRANE (ERM) OF BOTH EYES: ICD-10-CM

## 2022-10-13 PROCEDURE — 92134 CPTRZ OPH DX IMG PST SGM RTA: CPT | Performed by: OPTOMETRIST

## 2022-10-13 PROCEDURE — 92015 DETERMINE REFRACTIVE STATE: CPT

## 2022-10-13 PROCEDURE — 92004 COMPRE OPH EXAM NEW PT 1/>: CPT | Performed by: OPTOMETRIST

## 2022-10-13 PROCEDURE — G0463 HOSPITAL OUTPT CLINIC VISIT: HCPCS | Mod: 25

## 2022-10-13 ASSESSMENT — CONF VISUAL FIELD
METHOD: COUNTING FINGERS
OD_SUPERIOR_NASAL_RESTRICTION: 2
OS_SUPERIOR_TEMPORAL_RESTRICTION: 0
OS_INFERIOR_NASAL_RESTRICTION: 0
OS_SUPERIOR_NASAL_RESTRICTION: 0
OS_INFERIOR_TEMPORAL_RESTRICTION: 0
OS_NORMAL: 1

## 2022-10-13 ASSESSMENT — VISUAL ACUITY
OS_PH_SC: 20/30
OS_PH_SC+: +2
OD_SC+: -1
OS_SC: 20/40
OD_SC: 20/100
METHOD: SNELLEN - LINEAR

## 2022-10-13 ASSESSMENT — REFRACTION_MANIFEST
OS_AXIS: 175
OS_ADD: +2.50
OS_SPHERE: -1.25
OD_CYLINDER: SPHERE
OD_SPHERE: -1.00
OS_CYLINDER: +0.50
OD_ADD: +2.50

## 2022-10-13 ASSESSMENT — TONOMETRY
IOP_METHOD: TONOPEN
OD_IOP_MMHG: 14
OS_IOP_MMHG: 13

## 2022-10-13 ASSESSMENT — REFRACTION_WEARINGRX
OS_CYLINDER: +0.50
OS_SPHERE: +1.75
OD_CYLINDER: SPHERE
OS_AXIS: 171
OD_SPHERE: +2.75

## 2022-10-13 ASSESSMENT — EXTERNAL EXAM - LEFT EYE: OS_EXAM: NORMAL

## 2022-10-13 ASSESSMENT — SLIT LAMP EXAM - LIDS
COMMENTS: NORMAL
COMMENTS: NORMAL

## 2022-10-13 ASSESSMENT — EXTERNAL EXAM - RIGHT EYE: OD_EXAM: NORMAL

## 2022-10-13 NOTE — NURSING NOTE
"Chief Complaints and History of Present Illnesses   Patient presents with     Diabetic Eye Exam     Chief Complaint(s) and History of Present Illness(es)     Diabetic Eye Exam           Comments    Pt states vision is worse over the past year. Pt having difficulty seeing both distance and near.   Pt tried OTC reading glasses, but glasses did not help her see clearer. Pt states that \"she is looking thru wax paper\".  No eye pain today. No flashes or floaters. No redness or dryness.    DM2 BS: Pt has not checked sugars in about 1 week, BS monitor is broken.  Lab Results       Component                Value               Date                       A1C                      10.2                08/24/2022                 A1C                      7.4 @               10/15/2007                 A1C                      10.5                07/07/2005              MARIA LUISA Garces October 13, 2022 10:27 AM                       "

## 2022-10-23 ENCOUNTER — HEALTH MAINTENANCE LETTER (OUTPATIENT)
Age: 62
End: 2022-10-23

## 2022-10-28 ENCOUNTER — OFFICE VISIT (OUTPATIENT)
Dept: OPHTHALMOLOGY | Facility: CLINIC | Age: 62
End: 2022-10-28
Attending: STUDENT IN AN ORGANIZED HEALTH CARE EDUCATION/TRAINING PROGRAM
Payer: COMMERCIAL

## 2022-10-28 DIAGNOSIS — H52.7 REFRACTIVE ERROR: ICD-10-CM

## 2022-10-28 DIAGNOSIS — H04.123 DRY EYES, BILATERAL: ICD-10-CM

## 2022-10-28 DIAGNOSIS — E11.3291 TYPE 2 DIABETES MELLITUS WITH MILD NONPROLIFERATIVE RETINOPATHY OF RIGHT EYE WITHOUT MACULAR EDEMA, UNSPECIFIED WHETHER LONG TERM INSULIN USE (H): Primary | ICD-10-CM

## 2022-10-28 DIAGNOSIS — H35.373 EPIRETINAL MEMBRANE (ERM) OF BOTH EYES: ICD-10-CM

## 2022-10-28 DIAGNOSIS — Z96.1 PSEUDOPHAKIA OF BOTH EYES: ICD-10-CM

## 2022-10-28 DIAGNOSIS — H26.493 BILATERAL POSTERIOR CAPSULAR OPACIFICATION: ICD-10-CM

## 2022-10-28 DIAGNOSIS — E11.3312 MODERATE NONPROLIFERATIVE DIABETIC RETINOPATHY OF LEFT EYE WITH MACULAR EDEMA ASSOCIATED WITH TYPE 2 DIABETES MELLITUS (H): ICD-10-CM

## 2022-10-28 PROCEDURE — G0463 HOSPITAL OUTPT CLINIC VISIT: HCPCS | Mod: 25

## 2022-10-28 PROCEDURE — 66821 AFTER CATARACT LASER SURGERY: CPT | Mod: RT | Performed by: STUDENT IN AN ORGANIZED HEALTH CARE EDUCATION/TRAINING PROGRAM

## 2022-10-28 ASSESSMENT — CUP TO DISC RATIO
OS_RATIO: 0.4
OD_RATIO: 0.4

## 2022-10-28 ASSESSMENT — VISUAL ACUITY
OS_SC+: -2
OS_SC: 20/30
OS_PH_SC: 20/25
METHOD: SNELLEN - LINEAR
OD_SC+: -1
OD_SC: 20/100

## 2022-10-28 ASSESSMENT — SLIT LAMP EXAM - LIDS
COMMENTS: NORMAL
COMMENTS: NORMAL

## 2022-10-28 ASSESSMENT — CONF VISUAL FIELD
OS_NORMAL: 1
METHOD: COUNTING FINGERS
OS_SUPERIOR_TEMPORAL_RESTRICTION: 0
OS_INFERIOR_TEMPORAL_RESTRICTION: 0
OS_SUPERIOR_NASAL_RESTRICTION: 0
OS_INFERIOR_NASAL_RESTRICTION: 0
OD_SUPERIOR_NASAL_RESTRICTION: 2

## 2022-10-28 ASSESSMENT — EXTERNAL EXAM - LEFT EYE: OS_EXAM: NORMAL

## 2022-10-28 ASSESSMENT — TONOMETRY
OD_IOP_MMHG: 16
OS_IOP_MMHG: 15
IOP_METHOD: TONOPEN

## 2022-10-28 ASSESSMENT — EXTERNAL EXAM - RIGHT EYE: OD_EXAM: NORMAL

## 2022-10-28 NOTE — NURSING NOTE
"Chief Complaints and History of Present Illnesses   Patient presents with     Retinal Evaluation      Moderate/Severe Non-proliferative Diabetic Retinopathy, left eye > right eye.   Posterior Capsular Opacification, both eyes.      Chief Complaint(s) and History of Present Illness(es)     Retinal Evaluation            Comments:  Moderate/Severe Non-proliferative Diabetic Retinopathy, left eye > right eye.   Posterior Capsular Opacification, both eyes.           Comments    Pt ref by Dr Spangler   Pt states no change in VA since last visit  Pt states that \"she is looking thru wax paper\".   No eye pain today. No flashes or floaters. No redness or dryness.   LBS:   Pts monitor is broken  Last A1C: 10.2  Lab Results       Component                Value               Date                       A1C                      10.2                08/24/2022                 A1C                      7.4                10/15/2007                 A1C                      10.5                07/07/2005                  Yamel Jacob COT 9:48 AM October 28, 2022                                 "

## 2022-10-28 NOTE — PROGRESS NOTES
"HPI     Retinal Evaluation     Additional comments:  Moderate/Severe Non-proliferative Diabetic Retinopathy, left eye > right eye.   Posterior Capsular Opacification, both eyes.            Comments    Pt ref by Dr Spangler   Pt states no change in VA since last visit  Pt states that \"she is looking thru wax paper\".   No eye pain today. No flashes or floaters. No redness or dryness.   LBS:   Pts monitor is broken  Last A1C: 10.2  Lab Results       Component                Value               Date                       A1C                      10.2                08/24/2022                 A1C                      7.4                10/15/2007                 A1C                      10.5                07/07/2005                  Yamel Jacob COT 9:48 AM October 28, 2022                   Last edited by Yamel Jacob on 10/28/2022  9:53 AM.          Review of systems for the eyes was negative other than the pertinent positives/negatives listed in the HPI.    Ocular Meds: none    Ocular Hx: Pseudophakia OU     FOHx: no pertinent family ocular history    PMHx: asthma, T2DM, HLD    Assessment & Plan      Emily Mcdermott is a 62 year old female with the following diagnoses:    1. Type 2 diabetes mellitus with mild nonproliferative retinopathy of right eye without macular edema, unspecified whether long term insulin use (H)    2. Moderate nonproliferative diabetic retinopathy of left eye with macular edema associated with type 2 diabetes mellitus (H)    3. Pseudophakia of both eyes    4. Bilateral posterior capsular opacification    5. Epiretinal membrane (ERM) of both eyes    6. Dry eyes, bilateral    7. Refractive error       Mild NPDR OD   Moderate NPDR OS with macular edema  - strict BP/BG control  - mildly hazy view due to PCO during dilated eye exam; will repeat DFE after YAG Cap completed OU and subsequently will obtain repeat OCT Macula at that time  - mild edema noted on previous oct macula 10/13/22, can observe " for now    Pseudophakia OU  PCO OU  - dense PCO OU, r/b/a of YAG Capsulotomy d/w pt, pt expressed understanding, and would like to proceed; consent signed; see procedure note; Right eye first (today) then Left eye next week    ERM OU  - may be contributing to some decreased vision  - will repeat dilated exam and oct macula after YAG Cap  - amsler precautions    Dry Eyes OU   - preservative free artificial tears QID and prn OU    Refractive Error OU  - hold on refraction until after Yag Cap OU    Patient disposition:   Return for Follow Up VT, Yag Cap OS.    Attending Physician Attestation:  Complete documentation of historical and exam elements from today's encounter can be found in the full encounter summary report (not reduplicated in this progress note).  I personally obtained the chief complaint(s) and history of present illness.  I confirmed and edited as necessary the review of systems, past medical/surgical history, family history, social history, and examination findings as documented by others; and I examined the patient myself.  I personally reviewed the relevant tests, images, and reports as documented above.  I formulated and edited as necessary the assessment and plan and discussed the findings and management plan with the patient and family. . - Usha Duff MD

## 2022-11-01 ENCOUNTER — TELEPHONE (OUTPATIENT)
Dept: PHARMACY | Facility: CLINIC | Age: 62
End: 2022-11-01

## 2022-11-01 NOTE — TELEPHONE ENCOUNTER
Called patient as she has not read/responded to last few messages on mychart. No answer, LVM to return call.   Fariha Oropeza, PharmD, NAKUL, BCACP  MTM Pharmacist, [facility]

## 2022-11-04 ENCOUNTER — OFFICE VISIT (OUTPATIENT)
Dept: OPHTHALMOLOGY | Facility: CLINIC | Age: 62
End: 2022-11-04
Attending: STUDENT IN AN ORGANIZED HEALTH CARE EDUCATION/TRAINING PROGRAM
Payer: COMMERCIAL

## 2022-11-04 DIAGNOSIS — H52.7 REFRACTIVE ERROR: ICD-10-CM

## 2022-11-04 DIAGNOSIS — H26.492 LEFT POSTERIOR CAPSULAR OPACIFICATION: Primary | ICD-10-CM

## 2022-11-04 DIAGNOSIS — H35.373 EPIRETINAL MEMBRANE (ERM) OF BOTH EYES: ICD-10-CM

## 2022-11-04 DIAGNOSIS — H26.492 POSTERIOR CAPSULAR OPACIFICATION, LEFT EYE: ICD-10-CM

## 2022-11-04 DIAGNOSIS — E11.3312 MODERATE NONPROLIFERATIVE DIABETIC RETINOPATHY OF LEFT EYE WITH MACULAR EDEMA ASSOCIATED WITH TYPE 2 DIABETES MELLITUS (H): ICD-10-CM

## 2022-11-04 DIAGNOSIS — E11.3291 TYPE 2 DIABETES MELLITUS WITH MILD NONPROLIFERATIVE RETINOPATHY OF RIGHT EYE WITHOUT MACULAR EDEMA, UNSPECIFIED WHETHER LONG TERM INSULIN USE (H): Primary | ICD-10-CM

## 2022-11-04 DIAGNOSIS — Z96.1 PSEUDOPHAKIA OF BOTH EYES: ICD-10-CM

## 2022-11-04 DIAGNOSIS — E11.3311 TYPE 2 DIABETES MELLITUS WITH RIGHT EYE AFFECTED BY MODERATE NONPROLIFERATIVE RETINOPATHY AND MACULAR EDEMA, WITHOUT LONG-TERM CURRENT USE OF INSULIN (H): ICD-10-CM

## 2022-11-04 DIAGNOSIS — H04.123 DRY EYES, BILATERAL: ICD-10-CM

## 2022-11-04 PROCEDURE — 66821 AFTER CATARACT LASER SURGERY: CPT | Mod: LT | Performed by: STUDENT IN AN ORGANIZED HEALTH CARE EDUCATION/TRAINING PROGRAM

## 2022-11-04 PROCEDURE — G0463 HOSPITAL OUTPT CLINIC VISIT: HCPCS | Mod: 25

## 2022-11-04 ASSESSMENT — CONF VISUAL FIELD
OD_INFERIOR_NASAL_RESTRICTION: 0
OD_SUPERIOR_TEMPORAL_RESTRICTION: 0
OD_SUPERIOR_NASAL_RESTRICTION: 0
OS_SUPERIOR_NASAL_RESTRICTION: 0
OS_NORMAL: 1
OS_INFERIOR_TEMPORAL_RESTRICTION: 0
METHOD: COUNTING FINGERS
OS_SUPERIOR_TEMPORAL_RESTRICTION: 0
OS_INFERIOR_NASAL_RESTRICTION: 0
OD_NORMAL: 1
OD_INFERIOR_TEMPORAL_RESTRICTION: 0

## 2022-11-04 ASSESSMENT — SLIT LAMP EXAM - LIDS
COMMENTS: NORMAL
COMMENTS: NORMAL

## 2022-11-04 ASSESSMENT — TONOMETRY
OS_IOP_MMHG: 11
OD_IOP_MMHG: 14
IOP_METHOD: ICARE

## 2022-11-04 ASSESSMENT — CUP TO DISC RATIO: OS_RATIO: 0.4

## 2022-11-04 ASSESSMENT — VISUAL ACUITY
OS_SC: 20/30
OD_PH_SC+: +2
OD_SC: 20/40
OD_PH_SC: 20/40
OS_SC+: -2
OD_SC+: -2
METHOD: SNELLEN - LINEAR

## 2022-11-04 ASSESSMENT — EXTERNAL EXAM - RIGHT EYE: OD_EXAM: NORMAL

## 2022-11-04 ASSESSMENT — EXTERNAL EXAM - LEFT EYE: OS_EXAM: NORMAL

## 2022-11-04 NOTE — NURSING NOTE
Chief Complaints and History of Present Illnesses   Patient presents with     Follow Up     Chief Complaint(s) and History of Present Illness(es)     Follow Up           Comments    Patient states that her vision in the RE is much clearer. She states that the letter and numbers are still kind of wavy. Patient states that the LE is still foggy, but she can still make out letters. No pain and irritation. She states that she did see a floaters. No flashes of light.     Ocular Meds:artifical tears as needed in BE    Esequiel BANUELOS, November 4, 2022 9:30 AM

## 2022-11-04 NOTE — PROGRESS NOTES
HPI    Patient states that her vision in the RE is much clearer. She states that the letter and numbers are still kind of wavy. Patient states that the LE is still foggy, but she can still make out letters. No pain and irritation. She states that she did see a floaters. No flashes of light.     Ocular Meds:artifical tears as needed in RACHEL BANUELOS, November 4, 2022 9:30 AM        Last edited by Esequiel Morales on 11/4/2022  9:34 AM.          Review of systems for the eyes was negative other than the pertinent positives/negatives listed in the HPI.    Ocular Meds: none    Ocular Hx: Pseudophakia OU     FOHx: no pertinent family ocular history    PMHx: asthma, T2DM, HLD    Assessment & Plan      Emily Mcdermott is a 62 year old female with the following diagnoses:    1. Left posterior capsular opacification    2. Type 2 diabetes mellitus with right eye affected by moderate nonproliferative retinopathy and macular edema, without long-term current use of insulin (H)    3. Moderate nonproliferative diabetic retinopathy of left eye with macular edema associated with type 2 diabetes mellitus (H)    4. Pseudophakia of both eyes    5. Posterior capsular opacification, left eye    6. Epiretinal membrane (ERM) of both eyes    7. Dry eyes, bilateral    8. Refractive error       Moderate NPDR OU with macular edema  - strict BP/BG control  - mildly hazy view due to PCO during dilated eye exam; will need DFE after YAG Cap completed OU and subsequently will obtain repeat OCT Macula at that time  - mild edema noted on previous oct macula 10/13/22, can observe for now  - patient has follow up scheduled with Dr Andrade    Pseudophakia OU  PCO left eye  - s/p YAG CAP OD 10/28/22  - dense PCO OS, r/b/a of YAG Capsulotomy d/w pt, pt expressed understanding, and would like to proceed; consent signed; see procedure note; left eye today    ERM OU  - may be contributing to some decreased vision  - amsler precautions  - has retina  follow up scheduled already    Dry Eyes OU   - preservative free artificial tears QID and prn OU    Refractive Error OU  - hold on refraction until after Yag Cap OU    Patient disposition:   Return for Follow Up retina as scheduled, or sooner changes.    Attending Physician Attestation:  Complete documentation of historical and exam elements from today's encounter can be found in the full encounter summary report (not reduplicated in this progress note).  I personally obtained the chief complaint(s) and history of present illness.  I confirmed and edited as necessary the review of systems, past medical/surgical history, family history, social history, and examination findings as documented by others; and I examined the patient myself.  I personally reviewed the relevant tests, images, and reports as documented above.  I formulated and edited as necessary the assessment and plan and discussed the findings and management plan with the patient and family. . - Usha Duff MD

## 2022-11-08 NOTE — TELEPHONE ENCOUNTER
Additional message left.   Fariha Oropeza, PharmD, NAKUL, BCACP  MTM Pharmacist, [facility]

## 2022-11-16 ENCOUNTER — OFFICE VISIT (OUTPATIENT)
Dept: OPHTHALMOLOGY | Facility: CLINIC | Age: 62
End: 2022-11-16
Attending: OPHTHALMOLOGY
Payer: COMMERCIAL

## 2022-11-16 DIAGNOSIS — E11.3291 TYPE 2 DIABETES MELLITUS WITH MILD NONPROLIFERATIVE RETINOPATHY OF RIGHT EYE WITHOUT MACULAR EDEMA, UNSPECIFIED WHETHER LONG TERM INSULIN USE (H): ICD-10-CM

## 2022-11-16 PROCEDURE — 92134 CPTRZ OPH DX IMG PST SGM RTA: CPT | Performed by: OPHTHALMOLOGY

## 2022-11-16 PROCEDURE — 99207 FUNDUS PHOTOS OU (BOTH EYES): CPT | Mod: 26 | Performed by: OPHTHALMOLOGY

## 2022-11-16 PROCEDURE — 92250 FUNDUS PHOTOGRAPHY W/I&R: CPT | Performed by: OPHTHALMOLOGY

## 2022-11-16 PROCEDURE — 99214 OFFICE O/P EST MOD 30 MIN: CPT | Mod: 24 | Performed by: OPHTHALMOLOGY

## 2022-11-16 PROCEDURE — G0463 HOSPITAL OUTPT CLINIC VISIT: HCPCS | Mod: 25

## 2022-11-16 ASSESSMENT — TONOMETRY
OD_IOP_MMHG: 15
IOP_METHOD: ICARE
OS_IOP_MMHG: 12

## 2022-11-16 ASSESSMENT — CONF VISUAL FIELD
OD_INFERIOR_NASAL_RESTRICTION: 0
METHOD: COUNTING FINGERS
OS_NORMAL: 1
OS_INFERIOR_NASAL_RESTRICTION: 0
OD_NORMAL: 1
OD_SUPERIOR_TEMPORAL_RESTRICTION: 0
OD_INFERIOR_TEMPORAL_RESTRICTION: 0
OS_INFERIOR_TEMPORAL_RESTRICTION: 0
OS_SUPERIOR_NASAL_RESTRICTION: 0
OD_SUPERIOR_NASAL_RESTRICTION: 0
OS_SUPERIOR_TEMPORAL_RESTRICTION: 0

## 2022-11-16 ASSESSMENT — VISUAL ACUITY
OS_SC: 20/20
OD_SC: 20/50
METHOD_MR: DIAGNOSTIC
METHOD: SNELLEN - LINEAR

## 2022-11-16 ASSESSMENT — SLIT LAMP EXAM - LIDS
COMMENTS: NORMAL
COMMENTS: NORMAL

## 2022-11-16 ASSESSMENT — EXTERNAL EXAM - RIGHT EYE: OD_EXAM: NORMAL

## 2022-11-16 ASSESSMENT — EXTERNAL EXAM - LEFT EYE: OS_EXAM: NORMAL

## 2022-11-16 ASSESSMENT — CUP TO DISC RATIO
OS_RATIO: 0.3
OD_RATIO: 0.3

## 2022-11-16 ASSESSMENT — REFRACTION_MANIFEST
OD_SPHERE: +1.00
OD_CYLINDER: LOOSE LENS

## 2022-11-16 NOTE — PROGRESS NOTES
CC: Moderate NPDR each eye, referred by Dr. Duff  First appointment with me  HPI: Emily Mcdermott is a 62 year old year-old patient with history of diabetes. Diagnosed in her 30s initially with gestational diabetes that has regressed and returned.   Notes distortion in the vision in the right eye that is super wavey.     PMHx: T2DM (insulin, jardiance)  PSHx: CEIOL each eye     Labs:   A1c 10.2 9/2022    Retinal Imaging: November 16, 2022  OCT 11/16/22    RE: 2+ ERM,  Intraretinal cystoid pockets stable when c/w last visit, HEs,   LE: 1+ ERM, focal EZ loss inferior macula, HEs    Optos 11/16/22   RE: DBH 4 quadrants  LE: DBH 4 quadrants, HE at macula,     FAF 11/16/22  RE: areas of hypoautofluorescence at DBH  LE: areas of hypoautofluoresence at DBH    Assessment & Plan:    # DME right eye  - Patient prefers to wait today - has hesitance about injections  - Has had a series of 2-3 injections right eye only at outside clinic and did not notice subjective improvement  -  Okay to watch today     # Moderate NPDR each eye   - Diabetes for 30 years  - Last A1c 10.2%  - recommend fluorescein angiography transits right eye     # ERM each eye   - right eye> left eye  - patient noticing distortion in vision right eye and wavy lines  - would be interested in PPV/MP  - Would recommend pretreating with avastin prior to surgery   - VA 20/40 today right eye   - recommend to observe and reassess with fluorescein angiography transits right eye     # CEIOL each eye   - s/p YAG cap each eye   Patient reports improved vision after yag right eye   Small central opacity right eye - could consider additional yag       RTC: 4-6 weeks VTD Mac OCT, optos FA transit right eye    Connie Maravilla MD   PGY 3 Ophthalmology    ~~~~~~~~~~~~~~~~~~~~~~~~~~~~~~~~~~   Complete documentation of historical and exam elements from today's encounter can be found in the full encounter summary report (not reduplicated in this progress note).  I  personally obtained the chief complaint(s) and history of present illness.  I confirmed and edited as necessary the review of systems, past medical/surgical history, family history, social history, and examination findings as documented by others; and I examined the patient myself.  I personally reviewed the relevant tests, images, and reports as documented above.  I personally reviewed the ophthalmic test(s) associated with this encounter, agree with the interpretation(s) as documented by the resident/fellow, and have edited the corresponding report(s) as necessary.   I formulated and edited as necessary the assessment and plan and discussed the findings and management plan with the patient and family    Jeannie Andrade MD  Professor of Ophthalmology  Vitreo-Retinal surgeon   Department of Ophthalmology and Visual Neurosciences   [facility]  Phone: [phone]   Fax: [phone]

## 2022-11-16 NOTE — NURSING NOTE
Chief Complaints and History of Present Illnesses   Patient presents with     Retinal Evaluation     Chief Complaint(s) and History of Present Illness(es)     Retinal Evaluation            Laterality: both eyes    Associated symptoms: dryness and floaters (historic - infrequently).  Negative for eye pain, redness, pain with eye movement and flashes    Pain scale: 0/10          Comments    Pt here today for retinal assessment from Dr Duff.  RE has a wavy look to vision - no pain or discomfort.     Ocular meds = artificial tears 1 gtts QID BE    Sander Wright COA, LANNY 9:24 AM 11/16/2022

## 2022-11-16 NOTE — LETTER
11/16/2022       RE: Emily Mcdermott  [address] [id number]     Dear Colleague,    Thank you for referring your patient, Emily Mcdermott, to the [facility] [place] at [facility]. Please see a copy of my visit note below.      CC: Moderate NPDR each eye, referred by Dr. Duff  First appointment with me  HPI: Emily Mcdermott is a 62 year old year-old patient with history of diabetes. Diagnosed in her 30s initially with gestational diabetes that has regressed and returned.   Notes distortion in the vision in the right eye that is super wavey.     PMHx: T2DM (insulin, jardiance)  PSHx: CEIOL each eye     Labs:   A1c 10.2 9/2022    Retinal Imaging: November 16, 2022  OCT 11/16/22    RE: 2+ ERM,  Intraretinal cystoid pockets stable when c/w last visit, HEs,   LE: 1+ ERM, focal EZ loss inferior macula, HEs    Optos 11/16/22   RE: DBH 4 quadrants  LE: DBH 4 quadrants, HE at macula,     FAF 11/16/22  RE: areas of hypoautofluorescence at DBH  LE: areas of hypoautofluoresence at DBH    Assessment & Plan:    # DME right eye  - Patient prefers to wait today - has hesitance about injections  - Has had a series of 2-3 injections right eye only at outside clinic and did not notice subjective improvement  -  Okay to watch today     # Moderate NPDR each eye   - Diabetes for 30 years  - Last A1c 10.2%  - recommend fluorescein angiography transits right eye     # ERM each eye   - right eye> left eye  - patient noticing distortion in vision right eye and wavy lines  - would be interested in PPV/MP  - Would recommend pretreating with avastin prior to surgery   - VA 20/40 today right eye   - recommend to observe and reassess with fluorescein angiography transits right eye     # CEIOL each eye   - s/p YAG cap each eye   Patient reports improved vision after yag right eye   Small central opacity right eye - could consider additional  yag      RTC: 4-6 weeks VTD Mac OCT, optos FA transit right eye    Connie Maravilla MD   PGY 3 Ophthalmology    ~~~~~~~~~~~~~~~~~~~~~~~~~~~~~~~~~~      Jeannie Andrade MD  Professor of Ophthalmology.   Vitreo-retinal surgeon   Department of Ophthalmology and Visual Neurosciences   [facility]  Phone: [phone]   Fax: [phone]         Again, thank you for allowing me to participate in the care of your patient.      Sincerely,    Jeannie Andrade MD

## 2022-12-10 ENCOUNTER — HEALTH MAINTENANCE LETTER (OUTPATIENT)
Age: 62
End: 2022-12-10

## 2022-12-15 DIAGNOSIS — E11.3291 TYPE 2 DIABETES MELLITUS WITH MILD NONPROLIFERATIVE RETINOPATHY OF RIGHT EYE WITHOUT MACULAR EDEMA, UNSPECIFIED WHETHER LONG TERM INSULIN USE (H): Primary | ICD-10-CM

## 2023-03-09 DIAGNOSIS — E11.9 TYPE 2 DIABETES MELLITUS WITHOUT COMPLICATION, WITH LONG-TERM CURRENT USE OF INSULIN (H): ICD-10-CM

## 2023-03-09 DIAGNOSIS — Z79.4 TYPE 2 DIABETES MELLITUS WITHOUT COMPLICATION, WITH LONG-TERM CURRENT USE OF INSULIN (H): ICD-10-CM

## 2023-03-10 RX ORDER — EMPAGLIFLOZIN 10 MG/1
TABLET, FILM COATED ORAL
Qty: 30 TABLET | Refills: 0 | Status: SHIPPED | OUTPATIENT
Start: 2023-03-10 | End: 2023-04-21

## 2023-04-02 ENCOUNTER — HEALTH MAINTENANCE LETTER (OUTPATIENT)
Age: 63
End: 2023-04-02

## 2023-04-19 ENCOUNTER — MYC MEDICAL ADVICE (OUTPATIENT)
Dept: FAMILY MEDICINE | Facility: CLINIC | Age: 63
End: 2023-04-19
Payer: COMMERCIAL

## 2023-04-19 DIAGNOSIS — E11.9 TYPE 2 DIABETES MELLITUS WITHOUT COMPLICATION, WITH LONG-TERM CURRENT USE OF INSULIN (H): ICD-10-CM

## 2023-04-19 DIAGNOSIS — Z79.4 TYPE 2 DIABETES MELLITUS WITHOUT COMPLICATION, WITH LONG-TERM CURRENT USE OF INSULIN (H): ICD-10-CM

## 2023-04-21 RX ORDER — EMPAGLIFLOZIN 10 MG/1
TABLET, FILM COATED ORAL
Qty: 30 TABLET | Refills: 0 | Status: SHIPPED | OUTPATIENT
Start: 2023-04-21 | End: 2023-05-04

## 2023-04-21 NOTE — TELEPHONE ENCOUNTER
Prescription approved per [facility] Refill Protocol.  Future Appointments 4/21/2023 - 10/18/2023      Date Visit Type Length Department Provider     5/3/2023  1:00 PM OFFICE VISIT 30 min [facility] Felicia Avalos MD    Location Instructions:     [facility] is in [address] of the [facility] at [address] in [place]. The building is at the intersection with [facility] and along [facility]. This is the large, blue, glass building with a sculpture on the roof; please note there is no [employer] signage on the exterior. Free lot parking is available.                 Lisa ROBB RN

## 2023-05-03 ENCOUNTER — OFFICE VISIT (OUTPATIENT)
Dept: FAMILY MEDICINE | Facility: CLINIC | Age: 63
End: 2023-05-03
Payer: COMMERCIAL

## 2023-05-03 ENCOUNTER — ANCILLARY PROCEDURE (OUTPATIENT)
Dept: GENERAL RADIOLOGY | Facility: CLINIC | Age: 63
End: 2023-05-03
Attending: FAMILY MEDICINE
Payer: COMMERCIAL

## 2023-05-03 VITALS
HEART RATE: 80 BPM | SYSTOLIC BLOOD PRESSURE: 125 MMHG | TEMPERATURE: 97.3 F | OXYGEN SATURATION: 99 % | BODY MASS INDEX: 25.04 KG/M2 | DIASTOLIC BLOOD PRESSURE: 74 MMHG | HEIGHT: 65 IN | WEIGHT: 150.3 LBS | RESPIRATION RATE: 16 BRPM

## 2023-05-03 DIAGNOSIS — R55 NEAR SYNCOPE: ICD-10-CM

## 2023-05-03 DIAGNOSIS — E11.3291 TYPE 2 DIABETES MELLITUS WITH MILD NONPROLIFERATIVE RETINOPATHY OF RIGHT EYE WITHOUT MACULAR EDEMA, UNSPECIFIED WHETHER LONG TERM INSULIN USE (H): Primary | ICD-10-CM

## 2023-05-03 DIAGNOSIS — Z12.11 SCREEN FOR COLON CANCER: ICD-10-CM

## 2023-05-03 DIAGNOSIS — E78.5 HYPERLIPIDEMIA LDL GOAL <100: ICD-10-CM

## 2023-05-03 LAB
ALBUMIN SERPL BCG-MCNC: 4.4 G/DL (ref 3.5–5.2)
ALP SERPL-CCNC: 105 U/L (ref 35–104)
ALT SERPL W P-5'-P-CCNC: 23 U/L (ref 10–35)
ANION GAP SERPL CALCULATED.3IONS-SCNC: 15 MMOL/L (ref 7–15)
AST SERPL W P-5'-P-CCNC: 23 U/L (ref 10–35)
BILIRUB SERPL-MCNC: 0.4 MG/DL
BUN SERPL-MCNC: 15.8 MG/DL (ref 8–23)
CALCIUM SERPL-MCNC: 9.6 MG/DL (ref 8.8–10.2)
CHLORIDE SERPL-SCNC: 101 MMOL/L (ref 98–107)
CHOLEST SERPL-MCNC: 374 MG/DL
CREAT SERPL-MCNC: 0.93 MG/DL (ref 0.51–0.95)
CREAT UR-MCNC: 38.7 MG/DL
DEPRECATED HCO3 PLAS-SCNC: 23 MMOL/L (ref 22–29)
ERYTHROCYTE [DISTWIDTH] IN BLOOD BY AUTOMATED COUNT: 13.3 % (ref 10–15)
GFR SERPL CREATININE-BSD FRML MDRD: 69 ML/MIN/1.73M2
GLUCOSE SERPL-MCNC: 143 MG/DL (ref 70–99)
HBA1C MFR BLD: 10 % (ref 0–5.6)
HCT VFR BLD AUTO: 38.9 % (ref 35–47)
HDLC SERPL-MCNC: 74 MG/DL
HGB BLD-MCNC: 12.6 G/DL (ref 11.7–15.7)
LDLC SERPL CALC-MCNC: 279 MG/DL
MCH RBC QN AUTO: 28.9 PG (ref 26.5–33)
MCHC RBC AUTO-ENTMCNC: 32.4 G/DL (ref 31.5–36.5)
MCV RBC AUTO: 89 FL (ref 78–100)
MICROALBUMIN UR-MCNC: <12 MG/L
MICROALBUMIN/CREAT UR: NORMAL MG/G{CREAT}
NONHDLC SERPL-MCNC: 300 MG/DL
PLATELET # BLD AUTO: 300 10E3/UL (ref 150–450)
POTASSIUM SERPL-SCNC: 3.9 MMOL/L (ref 3.4–5.3)
PROT SERPL-MCNC: 7.3 G/DL (ref 6.4–8.3)
RBC # BLD AUTO: 4.36 10E6/UL (ref 3.8–5.2)
SODIUM SERPL-SCNC: 139 MMOL/L (ref 136–145)
TRIGL SERPL-MCNC: 103 MG/DL
TSH SERPL DL<=0.005 MIU/L-ACNC: 2.08 UIU/ML (ref 0.3–4.2)
WBC # BLD AUTO: 5.5 10E3/UL (ref 4–11)

## 2023-05-03 PROCEDURE — 80053 COMPREHEN METABOLIC PANEL: CPT | Performed by: FAMILY MEDICINE

## 2023-05-03 PROCEDURE — 84443 ASSAY THYROID STIM HORMONE: CPT | Performed by: FAMILY MEDICINE

## 2023-05-03 PROCEDURE — 99214 OFFICE O/P EST MOD 30 MIN: CPT | Performed by: FAMILY MEDICINE

## 2023-05-03 PROCEDURE — 82570 ASSAY OF URINE CREATININE: CPT | Performed by: FAMILY MEDICINE

## 2023-05-03 PROCEDURE — 36415 COLL VENOUS BLD VENIPUNCTURE: CPT | Performed by: FAMILY MEDICINE

## 2023-05-03 PROCEDURE — 71046 X-RAY EXAM CHEST 2 VIEWS: CPT | Mod: TC | Performed by: RADIOLOGY

## 2023-05-03 PROCEDURE — 82043 UR ALBUMIN QUANTITATIVE: CPT | Performed by: FAMILY MEDICINE

## 2023-05-03 PROCEDURE — 80061 LIPID PANEL: CPT | Performed by: FAMILY MEDICINE

## 2023-05-03 PROCEDURE — 85027 COMPLETE CBC AUTOMATED: CPT | Performed by: FAMILY MEDICINE

## 2023-05-03 PROCEDURE — 93000 ELECTROCARDIOGRAM COMPLETE: CPT | Performed by: FAMILY MEDICINE

## 2023-05-03 PROCEDURE — 83036 HEMOGLOBIN GLYCOSYLATED A1C: CPT | Performed by: FAMILY MEDICINE

## 2023-05-03 ASSESSMENT — ANXIETY QUESTIONNAIRES
GAD7 TOTAL SCORE: 15
2. NOT BEING ABLE TO STOP OR CONTROL WORRYING: NEARLY EVERY DAY
7. FEELING AFRAID AS IF SOMETHING AWFUL MIGHT HAPPEN: NOT AT ALL
6. BECOMING EASILY ANNOYED OR IRRITABLE: MORE THAN HALF THE DAYS
1. FEELING NERVOUS, ANXIOUS, OR ON EDGE: NEARLY EVERY DAY
8. IF YOU CHECKED OFF ANY PROBLEMS, HOW DIFFICULT HAVE THESE MADE IT FOR YOU TO DO YOUR WORK, TAKE CARE OF THINGS AT HOME, OR GET ALONG WITH OTHER PEOPLE?: SOMEWHAT DIFFICULT
GAD7 TOTAL SCORE: 15
4. TROUBLE RELAXING: NEARLY EVERY DAY
3. WORRYING TOO MUCH ABOUT DIFFERENT THINGS: NEARLY EVERY DAY
7. FEELING AFRAID AS IF SOMETHING AWFUL MIGHT HAPPEN: NOT AT ALL
GAD7 TOTAL SCORE: 15
5. BEING SO RESTLESS THAT IT IS HARD TO SIT STILL: SEVERAL DAYS
IF YOU CHECKED OFF ANY PROBLEMS ON THIS QUESTIONNAIRE, HOW DIFFICULT HAVE THESE PROBLEMS MADE IT FOR YOU TO DO YOUR WORK, TAKE CARE OF THINGS AT HOME, OR GET ALONG WITH OTHER PEOPLE: SOMEWHAT DIFFICULT

## 2023-05-03 ASSESSMENT — ASTHMA QUESTIONNAIRES
QUESTION_4 LAST FOUR WEEKS HOW OFTEN HAVE YOU USED YOUR RESCUE INHALER OR NEBULIZER MEDICATION (SUCH AS ALBUTEROL): ONCE A WEEK OR LESS
QUESTION_1 LAST FOUR WEEKS HOW MUCH OF THE TIME DID YOUR ASTHMA KEEP YOU FROM GETTING AS MUCH DONE AT WORK, SCHOOL OR AT HOME: NONE OF THE TIME
ACT_TOTALSCORE: 22
ACT_TOTALSCORE: 22
QUESTION_2 LAST FOUR WEEKS HOW OFTEN HAVE YOU HAD SHORTNESS OF BREATH: ONCE OR TWICE A WEEK
QUESTION_3 LAST FOUR WEEKS HOW OFTEN DID YOUR ASTHMA SYMPTOMS (WHEEZING, COUGHING, SHORTNESS OF BREATH, CHEST TIGHTNESS OR PAIN) WAKE YOU UP AT NIGHT OR EARLIER THAN USUAL IN THE MORNING: NOT AT ALL
QUESTION_5 LAST FOUR WEEKS HOW WOULD YOU RATE YOUR ASTHMA CONTROL: WELL CONTROLLED

## 2023-05-03 ASSESSMENT — PATIENT HEALTH QUESTIONNAIRE - PHQ9
SUM OF ALL RESPONSES TO PHQ QUESTIONS 1-9: 15
SUM OF ALL RESPONSES TO PHQ QUESTIONS 1-9: 15
10. IF YOU CHECKED OFF ANY PROBLEMS, HOW DIFFICULT HAVE THESE PROBLEMS MADE IT FOR YOU TO DO YOUR WORK, TAKE CARE OF THINGS AT HOME, OR GET ALONG WITH OTHER PEOPLE: SOMEWHAT DIFFICULT

## 2023-05-03 ASSESSMENT — PAIN SCALES - GENERAL: PAINLEVEL: NO PAIN (1)

## 2023-05-03 NOTE — PROGRESS NOTES
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 Bili, TP); Future  - Albumin Random Urine Quantitative with Creat Ratio; Future  - Lipid panel reflex to direct LDL Fasting; Future  - CBC with platelets; Future  - TSH with free T4 reflex; Future  - EKG 12-lead complete w/read - Clinics  - HEMOGLOBIN A1C  - Lipid panel reflex to direct LDL Non-fasting  - Comprehensive metabolic panel (BMP + Alb, Alk Phos, ALT, AST, Total. Bili, TP)  - Albumin Random Urine Quantitative with Creat Ratio  - CBC with platelets  - TSH with free T4 reflex  - insulin glargine U-300 (TOUJEO) 300 UNIT/ML (1 units dial) pen; Inject 10 Units Subcutaneous daily    Hyperlipidemia LDL goal <100  Will check lipids   - atorvastatin (LIPITOR) 20 MG tablet; Take 1 tablet (20 mg) by mouth daily  Pt had stopped the statin but her LDL came back at 279 - sent a message through [employer] about the result and the need to restart the lipitor at 20 mg and increase to 40 mg in a couple of weeks     Near syncope  CXR done today normal per my reading , also did an EKG and it is normal  Near syncope could be related to dehydration as a result of her hyperglycemia as DM is not under control , pt has been on Jardiance but has decreased the glargine insulin to 10 units once a day   - EKG 12-lead complete w/read - Clinics  - XR Chest 2 Views; Future  Recommended seeing a cardiologist   Also I have recommended Pharm D appointment as her glucose is not well controlled , hGb A1 c is at 10.0 currently , very high .  Screen for colon cancer  Will do cologuard   - COLOGUARD([employer]);  "Future    Follow up in 4 weeks sooner if any concerns        BMI:   Estimated body mass index is 25.4 kg/m  as calculated from the following:    Height as of this encounter: 1.638 m (5' 4.5\").    Weight as of this encounter: 68.2 kg (150 lb 4.8 oz).       Depression Screening Follow Up        5/3/2023    12:05 PM   PHQ   PHQ-9 Total Score 15   Q9: Thoughts of better off dead/self-harm past 2 weeks Not at all         5/3/2023    12:05 PM   Last PHQ-9   1.  Little interest or pleasure in doing things 2   2.  Feeling down, depressed, or hopeless 2   3.  Trouble falling or staying asleep, or sleeping too much 3   4.  Feeling tired or having little energy 3   5.  Poor appetite or overeating 2   6.  Feeling bad about yourself 1   7.  Trouble concentrating 1   8.  Moving slowly or restless 1   Q9: Thoughts of better off dead/self-harm past 2 weeks 0   PHQ-9 Total Score 15       Follow Up Actions Taken  Crisis resource information provided in After Visit Summary  Referred patient back to PCP         Felicia Avalos MD  [facility]    Daniel Diaz is a 62 year old, presenting for the following health issues:  Diabetes        5/3/2023    12:34 PM   Additional Questions   Roomed by Kp BRAVO     Requesting Handicap Parking approval?    History of Present Illness       Diabetes:   She presents for follow up of diabetes.  She is checking home blood glucose one time daily. She checks blood glucose before meals.  Blood glucose is sometimes over 200 and sometimes under 70. She is aware of hypoglycemia symptoms including shakiness. She is concerned about blood sugar frequently over 200. She is having numbness in feet.         She eats 4 or more servings of fruits and vegetables daily.She consumes 0 sweetened beverage(s) daily.She exercises with enough effort to increase her heart rate 20 to 29 minutes per day.  She exercises with enough effort to increase her heart rate 4 days per week.   She is taking " medications regularly.    Today's PHQ-9         PHQ-9 Total Score: 15    PHQ-9 Q9 Thoughts of better off dead/self-harm past 2 weeks :   Not at all    How difficult have these problems made it for you to do your work, take care of things at home, or get along with other people: Somewhat difficult  Today's SENIA-7 Score: 15   on Jardiance and likes it , has been on it for months now , needs refills   Additional Concern:   -2)  Extreme intermittent vertigo  Dizziness ,orthostatic    started five plus years , when waking up at first now it is random, not linked to activity or food , can be walking or sitting and will start all of a sudden   Getting up sometimes gets a head rush or with walking ,   Dizziness   Fasting in the am , usually 120 to 130 nighttime , dinner 5 to 7 untl 11 pm at night glucose is high but during the day it goes down in the normal range     DM type 2 and on insulin on decades   50s early morning when she was on the 14 of the insulin glargine    shorntess of breath , when walking sometimes and is out of breath     Review of Systems   Constitutional, HEENT, cardiovascular, pulmonary, GI, , musculoskeletal, neuro, skin, endocrine and psych systems are negative, except as otherwise noted.      Objective    LMP  (LMP Unknown)   There is no height or weight on file to calculate BMI.  Physical Exam   GENERAL: healthy, alert and no distress  EYES: Eyes grossly normal to inspection, PERRL and conjunctivae and sclerae normal  NECK: no adenopathy, no asymmetry, masses, or scars and thyroid normal to palpation  RESP: lungs clear to auscultation - no rales, rhonchi or wheezes  CV: regular rate and rhythm, normal S1 S2, no S3 or S4, no murmur, click or rub, no peripheral edema and peripheral pulses strong  ABDOMEN: soft, nontender, no hepatosplenomegaly, no masses and bowel sounds normal  MS: no gross musculoskeletal defects noted, no edema  NEURO: Normal strength and tone, mentation intact and speech  normal  PSYCH: mentation appears normal, affect normal/bright    Results for orders placed or performed in visit on 05/03/23   XR Chest 2 Views     Status: None    Narrative    XR CHEST 2 VIEWS   5/3/2023 1:57 PM     HISTORY: Near syncope    COMPARISON: Chest radiograph 7/8/2005      Impression    IMPRESSION: No acute cardiopulmonary disease.    REX RYAN MD         SYSTEM ID:  [id number]   Results for orders placed or performed in visit on 05/03/23   HEMOGLOBIN A1C     Status: Abnormal   Result Value Ref Range    Hemoglobin A1C 10.0 (H) 0.0 - 5.6 %   Lipid panel reflex to direct LDL Non-fasting     Status: Abnormal   Result Value Ref Range    Cholesterol 374 (H) <200 mg/dL    Triglycerides 103 <150 mg/dL    Direct Measure HDL 74 >=50 mg/dL    LDL Cholesterol Calculated 279 (H) <=100 mg/dL    Non HDL Cholesterol 300 (H) <130 mg/dL    Narrative    Cholesterol  Desirable:  <200 mg/dL    Triglycerides  Normal:  Less than 150 mg/dL  Borderline High:  150-199 mg/dL  High:  200-499 mg/dL  Very High:  Greater than or equal to 500 mg/dL    Direct Measure HDL  Female:  Greater than or equal to 50 mg/dL   Male:  Greater than or equal to 40 mg/dL    LDL Cholesterol  Desirable:  <100mg/dL  Above Desirable:  100-129 mg/dL   Borderline High:  130-159 mg/dL   High:  160-189 mg/dL   Very High:  >= 190 mg/dL    Non HDL Cholesterol  Desirable:  130 mg/dL  Above Desirable:  130-159 mg/dL  Borderline High:  160-189 mg/dL  High:  190-219 mg/dL  Very High:  Greater than or equal to 220 mg/dL   Comprehensive metabolic panel (BMP + Alb, Alk Phos, ALT, AST, Total. Bili, TP)     Status: Abnormal   Result Value Ref Range    Sodium 139 136 - 145 mmol/L    Potassium 3.9 3.4 - 5.3 mmol/L    Chloride 101 98 - 107 mmol/L    Carbon Dioxide (CO2) 23 22 - 29 mmol/L    Anion Gap 15 7 - 15 mmol/L    Urea Nitrogen 15.8 8.0 - 23.0 mg/dL    Creatinine 0.93 0.51 - 0.95 mg/dL    Calcium 9.6 8.8 - 10.2 mg/dL    Glucose 143 (H) 70 - 99 mg/dL    Alkaline  Phosphatase 105 (H) 35 - 104 U/L    AST 23 10 - 35 U/L    ALT 23 10 - 35 U/L    Protein Total 7.3 6.4 - 8.3 g/dL    Albumin 4.4 3.5 - 5.2 g/dL    Bilirubin Total 0.4 <=1.2 mg/dL    GFR Estimate 69 >60 mL/min/1.73m2   Albumin Random Urine Quantitative with Creat Ratio     Status: None   Result Value Ref Range    Creatinine Urine mg/dL 38.7 mg/dL    Albumin Urine mg/L <12.0 mg/L    Albumin Urine mg/g Cr     CBC with platelets     Status: Normal   Result Value Ref Range    WBC Count 5.5 4.0 - 11.0 10e3/uL    RBC Count 4.36 3.80 - 5.20 10e6/uL    Hemoglobin 12.6 11.7 - 15.7 g/dL    Hematocrit 38.9 35.0 - 47.0 %    MCV 89 78 - 100 fL    MCH 28.9 26.5 - 33.0 pg    MCHC 32.4 31.5 - 36.5 g/dL    RDW 13.3 10.0 - 15.0 %    Platelet Count 300 150 - 450 10e3/uL   TSH with free T4 reflex     Status: Normal   Result Value Ref Range    TSH 2.08 0.30 - 4.20 uIU/mL

## 2023-05-03 NOTE — PATIENT INSTRUCTIONS
PLEASE CALL TO SCHEDULE YOUR MAMMOGRAM  [facility] [phone]  [facility] [place] [phone]  [facility]   [phone]  Central Scheduling (all locations) [phone]    If you are under/uninsured, we recommend you contact the [facility]. They offer mammograms on a sliding fee charge. You can schedule with them at [phone].

## 2023-05-04 ENCOUNTER — LAB (OUTPATIENT)
Dept: FAMILY MEDICINE | Facility: CLINIC | Age: 63
End: 2023-05-04
Payer: COMMERCIAL

## 2023-05-04 DIAGNOSIS — Z12.11 SCREEN FOR COLON CANCER: ICD-10-CM

## 2023-05-04 RX ORDER — ATORVASTATIN CALCIUM 20 MG/1
20 TABLET, FILM COATED ORAL DAILY
Qty: 30 TABLET | Refills: 1 | Status: SHIPPED | OUTPATIENT
Start: 2023-05-04 | End: 2023-12-08

## 2023-05-10 ENCOUNTER — TELEPHONE (OUTPATIENT)
Dept: FAMILY MEDICINE | Facility: CLINIC | Age: 63
End: 2023-05-10
Payer: COMMERCIAL

## 2023-05-10 NOTE — TELEPHONE ENCOUNTER
MTM referral from: [facility] visit (referral by provider)    MTM referral outreach attempt #2 on May 10, 2023 at 2:58 PM      Outcome: Patient not reachable after several attempts, will route to MTM Pharmacist/Provider as an FYI.  [facility] scheduling number is [phone].  Thank you for the referral.    Use [email or web address] for the carrier/Plan on the flowsheet    Taylor Shahid - [facility]

## 2023-05-10 NOTE — TELEPHONE ENCOUNTER
mychart sent.   Fariha Oropeza PharmD, NAKUL, BCACP  MTM Pharmacist, [facility]

## 2023-05-21 DIAGNOSIS — E11.9 TYPE 2 DIABETES MELLITUS WITHOUT COMPLICATIONS (H): ICD-10-CM

## 2023-05-22 RX ORDER — EMPAGLIFLOZIN 10 MG/1
TABLET, FILM COATED ORAL
Qty: 1 TABLET | Refills: 0 | OUTPATIENT
Start: 2023-05-22

## 2023-08-17 NOTE — PATIENT INSTRUCTIONS
STOP ketorolac (GREY Top)  STOP ofloxacin (TAN Top)  Decrease prednisolone (PINK Top) to three times a day for 1 week, then twice a day for 1week, then once a day for 1 week, then STOP   [Negative] : Heme/Lymph

## 2023-08-27 ENCOUNTER — HEALTH MAINTENANCE LETTER (OUTPATIENT)
Age: 63
End: 2023-08-27

## 2023-11-05 ENCOUNTER — HEALTH MAINTENANCE LETTER (OUTPATIENT)
Age: 63
End: 2023-11-05

## 2023-11-29 ENCOUNTER — TELEPHONE (OUTPATIENT)
Dept: FAMILY MEDICINE | Facility: CLINIC | Age: 63
End: 2023-11-29
Payer: COMMERCIAL

## 2023-11-29 DIAGNOSIS — E11.9 TYPE 2 DIABETES MELLITUS WITHOUT COMPLICATIONS (H): ICD-10-CM

## 2023-11-29 RX ORDER — EMPAGLIFLOZIN 10 MG/1
10 TABLET, FILM COATED ORAL DAILY
Qty: 60 TABLET | Refills: 0 | Status: SHIPPED | OUTPATIENT
Start: 2023-11-29 | End: 2023-12-08

## 2023-11-29 NOTE — TELEPHONE ENCOUNTER
I have refilled it only for two months as she is due for a follow up , can you let her know this ?  Thanks

## 2023-11-29 NOTE — TELEPHONE ENCOUNTER
TC's,\    Please see message below from LS.  Can someone please call patient and schedule her for DM follow up?      Thank you,  Gunjan Cole RN

## 2023-12-08 ENCOUNTER — OFFICE VISIT (OUTPATIENT)
Dept: FAMILY MEDICINE | Facility: CLINIC | Age: 63
End: 2023-12-08
Payer: COMMERCIAL

## 2023-12-08 VITALS
OXYGEN SATURATION: 99 % | DIASTOLIC BLOOD PRESSURE: 80 MMHG | WEIGHT: 140.5 LBS | HEART RATE: 70 BPM | TEMPERATURE: 98.1 F | RESPIRATION RATE: 12 BRPM | HEIGHT: 65 IN | SYSTOLIC BLOOD PRESSURE: 120 MMHG | BODY MASS INDEX: 23.41 KG/M2

## 2023-12-08 DIAGNOSIS — E11.3312 MODERATE NONPROLIFERATIVE DIABETIC RETINOPATHY OF LEFT EYE WITH MACULAR EDEMA ASSOCIATED WITH TYPE 2 DIABETES MELLITUS (H): ICD-10-CM

## 2023-12-08 DIAGNOSIS — E11.3291 TYPE 2 DIABETES MELLITUS WITH MILD NONPROLIFERATIVE RETINOPATHY OF RIGHT EYE WITHOUT MACULAR EDEMA, UNSPECIFIED WHETHER LONG TERM INSULIN USE (H): Primary | ICD-10-CM

## 2023-12-08 DIAGNOSIS — E78.5 HYPERLIPIDEMIA LDL GOAL <100: ICD-10-CM

## 2023-12-08 DIAGNOSIS — Z23 ENCOUNTER FOR IMMUNIZATION: ICD-10-CM

## 2023-12-08 DIAGNOSIS — N95.1 MENOPAUSAL SYNDROME (HOT FLASHES): ICD-10-CM

## 2023-12-08 LAB
ALBUMIN SERPL BCG-MCNC: 4.7 G/DL (ref 3.5–5.2)
ALP SERPL-CCNC: 170 U/L (ref 40–150)
ALT SERPL W P-5'-P-CCNC: 23 U/L (ref 0–50)
ANION GAP SERPL CALCULATED.3IONS-SCNC: 10 MMOL/L (ref 7–15)
AST SERPL W P-5'-P-CCNC: 21 U/L (ref 0–45)
BILIRUB SERPL-MCNC: 0.2 MG/DL
BUN SERPL-MCNC: 18.5 MG/DL (ref 8–23)
CALCIUM SERPL-MCNC: 9.5 MG/DL (ref 8.8–10.2)
CHLORIDE SERPL-SCNC: 100 MMOL/L (ref 98–107)
CHOLEST SERPL-MCNC: 304 MG/DL
CREAT SERPL-MCNC: 0.79 MG/DL (ref 0.51–0.95)
DEPRECATED HCO3 PLAS-SCNC: 27 MMOL/L (ref 22–29)
EGFRCR SERPLBLD CKD-EPI 2021: 84 ML/MIN/1.73M2
FASTING STATUS PATIENT QL REPORTED: ABNORMAL
GLUCOSE SERPL-MCNC: 294 MG/DL (ref 70–99)
HBA1C MFR BLD: 12.4 % (ref 0–5.6)
HDLC SERPL-MCNC: 68 MG/DL
LDLC SERPL CALC-MCNC: 209 MG/DL
NONHDLC SERPL-MCNC: 236 MG/DL
POTASSIUM SERPL-SCNC: 4.2 MMOL/L (ref 3.4–5.3)
PROT SERPL-MCNC: 7.5 G/DL (ref 6.4–8.3)
SODIUM SERPL-SCNC: 137 MMOL/L (ref 135–145)
TRIGL SERPL-MCNC: 134 MG/DL

## 2023-12-08 PROCEDURE — 80053 COMPREHEN METABOLIC PANEL: CPT | Performed by: FAMILY MEDICINE

## 2023-12-08 PROCEDURE — 90686 IIV4 VACC NO PRSV 0.5 ML IM: CPT | Performed by: FAMILY MEDICINE

## 2023-12-08 PROCEDURE — 83036 HEMOGLOBIN GLYCOSYLATED A1C: CPT | Performed by: FAMILY MEDICINE

## 2023-12-08 PROCEDURE — 90472 IMMUNIZATION ADMIN EACH ADD: CPT | Performed by: FAMILY MEDICINE

## 2023-12-08 PROCEDURE — 36415 COLL VENOUS BLD VENIPUNCTURE: CPT | Performed by: FAMILY MEDICINE

## 2023-12-08 PROCEDURE — 80061 LIPID PANEL: CPT | Performed by: FAMILY MEDICINE

## 2023-12-08 PROCEDURE — 90471 IMMUNIZATION ADMIN: CPT | Performed by: FAMILY MEDICINE

## 2023-12-08 PROCEDURE — 99214 OFFICE O/P EST MOD 30 MIN: CPT | Mod: 25 | Performed by: FAMILY MEDICINE

## 2023-12-08 PROCEDURE — 90480 ADMN SARSCOV2 VAC 1/ONLY CMP: CPT | Performed by: FAMILY MEDICINE

## 2023-12-08 PROCEDURE — 91320 SARSCV2 VAC 30MCG TRS-SUC IM: CPT | Performed by: FAMILY MEDICINE

## 2023-12-08 PROCEDURE — 90715 TDAP VACCINE 7 YRS/> IM: CPT | Performed by: FAMILY MEDICINE

## 2023-12-08 RX ORDER — ATORVASTATIN CALCIUM 20 MG/1
20 TABLET, FILM COATED ORAL DAILY
Qty: 90 TABLET | Refills: 3 | Status: SHIPPED | OUTPATIENT
Start: 2023-12-08

## 2023-12-08 ASSESSMENT — PATIENT HEALTH QUESTIONNAIRE - PHQ9
SUM OF ALL RESPONSES TO PHQ QUESTIONS 1-9: 7
SUM OF ALL RESPONSES TO PHQ QUESTIONS 1-9: 7
10. IF YOU CHECKED OFF ANY PROBLEMS, HOW DIFFICULT HAVE THESE PROBLEMS MADE IT FOR YOU TO DO YOUR WORK, TAKE CARE OF THINGS AT HOME, OR GET ALONG WITH OTHER PEOPLE: SOMEWHAT DIFFICULT

## 2023-12-08 ASSESSMENT — ANXIETY QUESTIONNAIRES
4. TROUBLE RELAXING: NEARLY EVERY DAY
1. FEELING NERVOUS, ANXIOUS, OR ON EDGE: NOT AT ALL
GAD7 TOTAL SCORE: 4
GAD7 TOTAL SCORE: 4
3. WORRYING TOO MUCH ABOUT DIFFERENT THINGS: SEVERAL DAYS
6. BECOMING EASILY ANNOYED OR IRRITABLE: NOT AT ALL
IF YOU CHECKED OFF ANY PROBLEMS ON THIS QUESTIONNAIRE, HOW DIFFICULT HAVE THESE PROBLEMS MADE IT FOR YOU TO DO YOUR WORK, TAKE CARE OF THINGS AT HOME, OR GET ALONG WITH OTHER PEOPLE: SOMEWHAT DIFFICULT
5. BEING SO RESTLESS THAT IT IS HARD TO SIT STILL: NOT AT ALL
2. NOT BEING ABLE TO STOP OR CONTROL WORRYING: NOT AT ALL
7. FEELING AFRAID AS IF SOMETHING AWFUL MIGHT HAPPEN: NOT AT ALL

## 2023-12-08 ASSESSMENT — ASTHMA QUESTIONNAIRES: ACT_TOTALSCORE: 16

## 2023-12-08 ASSESSMENT — PAIN SCALES - GENERAL: PAINLEVEL: NO PAIN (0)

## 2023-12-08 NOTE — NURSING NOTE
Pt received TDAP, COVID, and flu vaccine per provider order. Provider, Dr. Avalos, verbally ordered this RN to delete Flublok vaccine order and to enter order for Fluzone vaccine - RN completed per verbal order from provider.   Pt declined RSV vaccine at this time - RN notified provider. Provider, Dr. Avalos, gave verbal order to delete RSV vaccine order - RN completed.  Prior to immunization administration, verified patients identity using patient s name and date of birth. RN reviewed COVID and flu vaccine screening questions with Pt prior to immunization - no concerns noted. Pt given VIS forms.     Patient instructed to remain in clinic for 15 minutes afterwards, and to report any adverse reactions.     Performed by Humberto Corea RN on 12/8/2023 at 11:32 AM.

## 2023-12-08 NOTE — PROGRESS NOTES
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 Bili, TP); Future  - empagliflozin (JARDIANCE) 10 MG TABS tablet; Take 1 tablet (10 mg) by mouth daily  - insulin glargine U-300 (TOUJEO) 300 UNIT/ML (1 units dial) pen; Inject 10 Units Subcutaneous daily  - Lipid panel reflex to direct LDL Fasting  - HEMOGLOBIN A1C  - Comprehensive metabolic panel (BMP + Alb, Alk Phos, ALT, AST, Total. Bili, TP)  Her HGB A1 c is high at 12.4 , she needs to restart the insulin glargine and also Jardiance   Refills sent , I also recommend that she sees Gaurav Fried in our clinic for MTM     Hyperlipidemia LDL goal <100  Has not been taking her lipitor , discussed and sent the Rx for this   - atorvastatin (LIPITOR) 20 MG tablet; Take 1 tablet (20 mg) by mouth daily  Rcheck lipids in 6 to 8 weeks     Encounter for immunization  Got her tetanus and covid immunizations today   - TDAP 10-64Y (ADACEL,BOOSTRIX)  - COVID-19 12+ (2023-24) (PFIZER)    Menopausal syndrome (hot flashes)  Discussed different options HRT ( she is not interested in that ) , diet vs other meds like SSRIs or SNRIs to treat hot flashes     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  She is checking home blood glucose a few times a month.   She checks blood glucose before meals.  Blood glucose is sometimes over 200 and sometimes under 70. She is aware of hypoglycemia symptoms including shakiness.   She is concerned about blood sugar frequently over 200.   She is having numbness in feet and blurry vision.  The patient has not had a diabetic eye exam in the last 12 months.          Reason for visit:  Hot flashes every day for years and am exhausted    She eats 4 or more servings of fruits and vegetables daily.She consumes 0 sweetened beverage(s) daily.She exercises with enough effort to increase her heart rate 20 to 29 minutes per day.  She exercises with enough effort to increase her heart rate 6 days per week.   She is taking medications regularly.     Hot flashes  - Menopause related  - over a decade  - waking 5 or so times per night   - looking for non hormonal options     Illness about 2 weeks ago  - 3 week duration  - no current issues (per PT)              Review of Systems         Objective    LMP  (LMP Unknown)   There is no height or weight on file to calculate BMI.  Physical Exam

## 2023-12-10 PROBLEM — N95.1 MENOPAUSAL SYNDROME (HOT FLASHES): Status: ACTIVE | Noted: 2023-12-10

## 2023-12-14 ENCOUNTER — TELEPHONE (OUTPATIENT)
Dept: FAMILY MEDICINE | Facility: CLINIC | Age: 63
End: 2023-12-14
Payer: COMMERCIAL

## 2023-12-14 NOTE — TELEPHONE ENCOUNTER
MT referral from: [facility] visit (referral by provider)    MTM referral outreach attempt #2 on December 14, 2023 at 1:22 PM      Outcome: Patient not reachable after several attempts, will route to [facility] Pharmacist/Provider as an FYI.  [facility] scheduling number is .  Thank you for the referral.    Use [facility] for the carrier/Plan on the flowsheet       [employer] Message Sent    Val Valle  [facility]

## 2024-03-24 ENCOUNTER — HEALTH MAINTENANCE LETTER (OUTPATIENT)
Age: 64
End: 2024-03-24

## 2024-08-11 ENCOUNTER — HEALTH MAINTENANCE LETTER (OUTPATIENT)
Age: 64
End: 2024-08-11

## 2024-09-04 DIAGNOSIS — E11.3291 TYPE 2 DIABETES MELLITUS WITH MILD NONPROLIFERATIVE RETINOPATHY OF RIGHT EYE WITHOUT MACULAR EDEMA, UNSPECIFIED WHETHER LONG TERM INSULIN USE (H): ICD-10-CM

## 2024-09-04 RX ORDER — INSULIN GLARGINE 300 U/ML
10 INJECTION, SOLUTION SUBCUTANEOUS DAILY
Qty: 4.5 ML | Refills: 1 | Status: SHIPPED | OUTPATIENT
Start: 2024-09-04

## 2024-12-10 DIAGNOSIS — E11.3291 TYPE 2 DIABETES MELLITUS WITH MILD NONPROLIFERATIVE RETINOPATHY OF RIGHT EYE WITHOUT MACULAR EDEMA, UNSPECIFIED WHETHER LONG TERM INSULIN USE (H): ICD-10-CM

## 2024-12-10 RX ORDER — EMPAGLIFLOZIN 10 MG/1
10 TABLET, FILM COATED ORAL DAILY
Qty: 60 TABLET | Refills: 0 | Status: SHIPPED | OUTPATIENT
Start: 2024-12-10

## 2024-12-22 ENCOUNTER — HEALTH MAINTENANCE LETTER (OUTPATIENT)
Age: 64
End: 2024-12-22

## 2025-01-21 DIAGNOSIS — E11.3291 TYPE 2 DIABETES MELLITUS WITH MILD NONPROLIFERATIVE RETINOPATHY OF RIGHT EYE WITHOUT MACULAR EDEMA, UNSPECIFIED WHETHER LONG TERM INSULIN USE (H): ICD-10-CM

## 2025-01-21 NOTE — TELEPHONE ENCOUNTER
Pt calling stating she lost her jardiance medication and has been off of it for 3 days. She last filled on 12/10/24 with a 2 month supply but has lost the other half so is requesting a refill to get her to when she would normally fill it again in February.    Please advise,    Thanks!  Vega CANTOR RN   [facility]

## 2025-01-22 NOTE — TELEPHONE ENCOUNTER
I have signed the Rx   Can you let her know ? I also would raven her to make an appointment with Fariha for the MTM   Thanks

## 2025-02-13 ENCOUNTER — VIRTUAL VISIT (OUTPATIENT)
Dept: PHARMACY | Facility: CLINIC | Age: 65
End: 2025-02-13
Payer: COMMERCIAL

## 2025-02-13 DIAGNOSIS — E11.9 TYPE 2 DIABETES MELLITUS WITHOUT COMPLICATION, WITH LONG-TERM CURRENT USE OF INSULIN (H): Primary | ICD-10-CM

## 2025-02-13 DIAGNOSIS — J45.909 ASTHMA, UNSPECIFIED ASTHMA SEVERITY, UNSPECIFIED WHETHER COMPLICATED, UNSPECIFIED WHETHER PERSISTENT: ICD-10-CM

## 2025-02-13 DIAGNOSIS — M54.9 BACK PAIN, UNSPECIFIED BACK LOCATION, UNSPECIFIED BACK PAIN LATERALITY, UNSPECIFIED CHRONICITY: ICD-10-CM

## 2025-02-13 DIAGNOSIS — N95.1 MENOPAUSAL SYNDROME (HOT FLASHES): ICD-10-CM

## 2025-02-13 DIAGNOSIS — E78.5 HYPERLIPIDEMIA LDL GOAL <100: ICD-10-CM

## 2025-02-13 DIAGNOSIS — G47.9 DIFFICULTY SLEEPING: ICD-10-CM

## 2025-02-13 DIAGNOSIS — Z79.4 TYPE 2 DIABETES MELLITUS WITHOUT COMPLICATION, WITH LONG-TERM CURRENT USE OF INSULIN (H): Primary | ICD-10-CM

## 2025-02-13 DIAGNOSIS — Z78.9 TAKES DIETARY SUPPLEMENTS: ICD-10-CM

## 2025-02-13 PROCEDURE — 99605 MTMS BY PHARM NP 15 MIN: CPT | Mod: 93

## 2025-02-13 PROCEDURE — 99607 MTMS BY PHARM ADDL 15 MIN: CPT | Mod: 93

## 2025-02-13 RX ORDER — VITAMIN B COMPLEX
50 TABLET ORAL DAILY
COMMUNITY

## 2025-02-13 NOTE — Clinical Note
FYI - will be talking to you about treatment options for night sweats at your next appointment, I have some assessment/recommendations in my note if you want to look at those

## 2025-02-13 NOTE — PROGRESS NOTES
Medication Therapy Management (MTM) Encounter    ASSESSMENT:                            Medication Adherence/Access: See below for considerations.    Diabetes:   Difficult to assess patient's current status since last A1c and UACR were over 1 year ago and she does not check her blood sugars at home. Will reassess after labs are drawn at upcoming PCP visit.  Based on fill records, patient should be able to fill her new prescription of her Jardiance, would encourage patient to do so and restart taking consistently.  Due for eye and foot exam, patient would still be recommended to take aspirin but not taking due to previous side effects.    Hyperlipidemia   Patient has not had her cholesterol checked since starting atorvastatin, will reassess after labs at upcoming PCP appointment.    Supplements   Stable.     Menopausal Effects:  Green tea extract can reduce atorvastatin levels due to OATP inhibition leading to lack of efficacy to reduce cholesterol levels. Due to this interaction and patient still experiencing symptoms after starting her supplement, may be beneficial to look into other options for bothersome night sweats - would defer to PCP for further evaluation. Not indicated for hormonal treatment due to >60 years old and >10 years from menopause onset  FDA-approved options for non-hormonal treatment include Veozah (price may be a barrier) or paroxetine (not recommended in older adults so may not prefer to start this). Could consider other options that would be affordable and safe as patient continues to age including citalopram, escitalopram, venlafaxine, desvenlafaxine, or gabapentin. May prefer selective serotonin reuptake inhibitor/SNRI over gabapentin due to lower risk of sedation.    Back Pain:  Would defer to PCP for further evaluation.    Asthma:  Stable.    Difficulty Sleeping:  Reducing night sweats (as noted above) may improve sleep for patient.    PLAN:                            I will check in on  your labs after your appointment with Dr. Avalos and see what changes we may need to make to your medications.  Talk to Dr. Avalos at your upcoming appointment about your pain and potential medications for your night sweats    Follow-up: MyChart message after your labs from your visit with Dr. Avalos come back    SUBJECTIVE/OBJECTIVE:                          Emily Mcdermott is a 64 year old female contacted via secure video for a follow-up visit from 9/13/2022.       Reason for visit: Comprehensive medication review, diabetes management, menopause.    Allergies/ADRs: Reviewed in chart  Past Medical History: Reviewed in chart  Tobacco: She reports that she quit smoking about 43 years ago. She quit smokeless tobacco use about 43 years ago.  Alcohol: very rarely  Caffeine: ~5 tea bags per day green or breakfast tea  Medication Adherence/Access: lost her Jardiance prescription and was not able to get a new supply from the pharmacy    Diabetes   Jardiance 10 mg daily - last filled for a 60 day supply on 12/10  Toujeo 10 units daily  Not taking aspirin due to GI upset with this in past  Patient reports no current side effects  Current diabetes symptoms: none, reports no low blood sugars    Previously used a Rene, but has a bad experience with this (reports she had a few defects and a needle broke off in her arm)     Blood sugar monitoring: does not check her blood sugars at home  Eye exam in the last 12 months? Reports she thinks it has been about one year since her last eye exam  Foot exam: due    Hyperlipidemia   Atorvastatin 20 mg daily  Patient reports no significant myalgias or other side effects.     Supplements   Omega-3 fatty acids 900 mg daily  Vitamin B complex 1 tablet daily  Vitamin D 200 units daily  No reported issues at this time.      Menopausal Effects:  [employer] Menopause Support Restful Sleep  Patient reports this has been very helpful - is waking now 1-2 times per night which is less than  before  Has been in menopause since 2008    Back Pain:  Patient is experiencing pain in her back near where her lungs are, even at rest.    Asthma:  Albuterol HFA as needed - uses very infrequently (last used in the summer)  Patient reports symptoms are much better now than they used to be    Difficulty Sleeping:  Melatonin 5 mg as needed - has been using for 2-3 nights in a row then a few nights off  Reports difficulty is falling back asleep after getting up due to night sweats  ----------------  I spent 38 minutes with this patient today. I offer these suggestions for consideration by Dr. Avalos. A copy of the visit note was provided to the patient's provider(s).    A summary of these recommendations was sent via [email or web address].    Aide Garcia, Rosalie  Medication Therapy Management Pharmacy Resident  [facility]    Preceptor cosignature: Emily Mcdermott was seen independently by Dr. Garcia. I have reviewed the assessment and plan. Fariha Oropeza, PharmD, NAKUL, BCACP    Telemedicine Visit Details  The patient's medications can be safely assessed via a telemedicine encounter.  Type of service:  Video Conference via [employer]  Originating Location (pt. Location): Home    Distant Location (provider location):  On-site  Start Time: 3:44 PM  End Time: 4:23 PM     Medication Therapy Recommendations  No medication therapy recommendations to display

## 2025-02-16 NOTE — PATIENT INSTRUCTIONS
"Recommendations from today's MTM visit:                                                    MTM (medication therapy management) is a service provided by a clinical pharmacist designed to help you get the most of out of your medicines.   Today we reviewed what your medicines are for, how to know if they are working, that your medicines are safe and how to make your medicine regimen as easy as possible.      I will check in on your labs after your appointment with Dr. Avalos and see what changes we may need to make to your medications.  Talk to Dr. Avalos at your upcoming appointment about your pain and potential medications for your night sweats    Follow-up: [email or web address] message after your labs from your visit with Dr. Avalos come back    It was great speaking with you today.  I value your experience and would be very thankful for your time in providing feedback in our clinic survey. In the next few days, you may receive an email or text message from for; to (do) Centers with a link to a survey related to your  clinical pharmacist.\"     To schedule another MTM appointment, please call the clinic directly or you may call the MTM scheduling line at [phone] or toll-free at [phone].     My Clinical Pharmacist's contact information:                                                      Please feel free to contact me with any questions or concerns you have.      Aide Garcia, PharmD  Medication Therapy Management Resident, [facility]  Phone: [phone]    Fariha Oropeza, Pharm.D., M.B.A., [facility]  Medication Therapy Management Pharmacist, [facility]  Phone: [phone]   "

## 2025-03-06 DIAGNOSIS — E78.5 HYPERLIPIDEMIA LDL GOAL <100: ICD-10-CM

## 2025-03-06 RX ORDER — ATORVASTATIN CALCIUM 20 MG/1
20 TABLET, FILM COATED ORAL DAILY
Qty: 90 TABLET | Refills: 0 | Status: SHIPPED | OUTPATIENT
Start: 2025-03-06

## 2025-03-23 ENCOUNTER — HEALTH MAINTENANCE LETTER (OUTPATIENT)
Age: 65
End: 2025-03-23

## 2025-04-12 DIAGNOSIS — E11.3291 TYPE 2 DIABETES MELLITUS WITH MILD NONPROLIFERATIVE RETINOPATHY OF RIGHT EYE WITHOUT MACULAR EDEMA, UNSPECIFIED WHETHER LONG TERM INSULIN USE (H): ICD-10-CM

## 2025-04-14 RX ORDER — EMPAGLIFLOZIN 10 MG/1
10 TABLET, FILM COATED ORAL DAILY
Qty: 30 TABLET | Refills: 0 | Status: SHIPPED | OUTPATIENT
Start: 2025-04-14

## 2025-05-06 ENCOUNTER — OFFICE VISIT (OUTPATIENT)
Dept: FAMILY MEDICINE | Facility: CLINIC | Age: 65
End: 2025-05-06
Payer: COMMERCIAL

## 2025-05-06 VITALS
BODY MASS INDEX: 25.61 KG/M2 | OXYGEN SATURATION: 98 % | DIASTOLIC BLOOD PRESSURE: 80 MMHG | RESPIRATION RATE: 16 BRPM | TEMPERATURE: 97.4 F | HEART RATE: 71 BPM | SYSTOLIC BLOOD PRESSURE: 122 MMHG | WEIGHT: 150 LBS | HEIGHT: 64 IN

## 2025-05-06 DIAGNOSIS — E11.3291 TYPE 2 DIABETES MELLITUS WITH MILD NONPROLIFERATIVE RETINOPATHY OF RIGHT EYE WITHOUT MACULAR EDEMA, UNSPECIFIED WHETHER LONG TERM INSULIN USE (H): Primary | ICD-10-CM

## 2025-05-06 DIAGNOSIS — M79.671 RIGHT FOOT PAIN: ICD-10-CM

## 2025-05-06 DIAGNOSIS — Z13.6 SCREENING FOR CARDIOVASCULAR CONDITION: ICD-10-CM

## 2025-05-06 DIAGNOSIS — F33.1 MODERATE RECURRENT MAJOR DEPRESSION (H): ICD-10-CM

## 2025-05-06 DIAGNOSIS — E11.3312 MODERATE NONPROLIFERATIVE DIABETIC RETINOPATHY OF LEFT EYE WITH MACULAR EDEMA ASSOCIATED WITH TYPE 2 DIABETES MELLITUS (H): ICD-10-CM

## 2025-05-06 LAB
ALBUMIN SERPL BCG-MCNC: 4.3 G/DL (ref 3.5–5.2)
ALP SERPL-CCNC: 114 U/L (ref 40–150)
ALT SERPL W P-5'-P-CCNC: 22 U/L (ref 0–50)
ANION GAP SERPL CALCULATED.3IONS-SCNC: 7 MMOL/L (ref 7–15)
AST SERPL W P-5'-P-CCNC: 23 U/L (ref 0–45)
BILIRUB SERPL-MCNC: 0.2 MG/DL
BUN SERPL-MCNC: 16.8 MG/DL (ref 8–23)
CALCIUM SERPL-MCNC: 9.6 MG/DL (ref 8.8–10.4)
CHLORIDE SERPL-SCNC: 103 MMOL/L (ref 98–107)
CHOLEST SERPL-MCNC: 168 MG/DL
CREAT SERPL-MCNC: 0.89 MG/DL (ref 0.51–0.95)
CREAT UR-MCNC: 46.5 MG/DL
EGFRCR SERPLBLD CKD-EPI 2021: 72 ML/MIN/1.73M2
EST. AVERAGE GLUCOSE BLD GHB EST-MCNC: 286 MG/DL
FASTING STATUS PATIENT QL REPORTED: NO
FASTING STATUS PATIENT QL REPORTED: NO
GLUCOSE SERPL-MCNC: 133 MG/DL (ref 70–99)
HBA1C MFR BLD: 11.6 % (ref 0–5.6)
HCO3 SERPL-SCNC: 29 MMOL/L (ref 22–29)
HDLC SERPL-MCNC: 65 MG/DL
LDLC SERPL CALC-MCNC: 85 MG/DL
MICROALBUMIN UR-MCNC: 12.4 MG/L
MICROALBUMIN/CREAT UR: 26.67 MG/G CR (ref 0–25)
NONHDLC SERPL-MCNC: 103 MG/DL
POTASSIUM SERPL-SCNC: 4 MMOL/L (ref 3.4–5.3)
PROT SERPL-MCNC: 7 G/DL (ref 6.4–8.3)
SODIUM SERPL-SCNC: 139 MMOL/L (ref 135–145)
TRIGL SERPL-MCNC: 91 MG/DL

## 2025-05-06 PROCEDURE — 36415 COLL VENOUS BLD VENIPUNCTURE: CPT | Performed by: FAMILY MEDICINE

## 2025-05-06 PROCEDURE — 3074F SYST BP LT 130 MM HG: CPT | Performed by: FAMILY MEDICINE

## 2025-05-06 PROCEDURE — 3079F DIAST BP 80-89 MM HG: CPT | Performed by: FAMILY MEDICINE

## 2025-05-06 PROCEDURE — 82570 ASSAY OF URINE CREATININE: CPT | Performed by: FAMILY MEDICINE

## 2025-05-06 PROCEDURE — 83036 HEMOGLOBIN GLYCOSYLATED A1C: CPT | Performed by: FAMILY MEDICINE

## 2025-05-06 PROCEDURE — 1126F AMNT PAIN NOTED NONE PRSNT: CPT | Performed by: FAMILY MEDICINE

## 2025-05-06 PROCEDURE — 80053 COMPREHEN METABOLIC PANEL: CPT | Performed by: FAMILY MEDICINE

## 2025-05-06 PROCEDURE — 82043 UR ALBUMIN QUANTITATIVE: CPT | Performed by: FAMILY MEDICINE

## 2025-05-06 PROCEDURE — 99214 OFFICE O/P EST MOD 30 MIN: CPT | Performed by: FAMILY MEDICINE

## 2025-05-06 PROCEDURE — 80061 LIPID PANEL: CPT | Performed by: FAMILY MEDICINE

## 2025-05-06 ASSESSMENT — ASTHMA QUESTIONNAIRES
QUESTION_5 LAST FOUR WEEKS HOW WOULD YOU RATE YOUR ASTHMA CONTROL: COMPLETELY CONTROLLED
QUESTION_2 LAST FOUR WEEKS HOW OFTEN HAVE YOU HAD SHORTNESS OF BREATH: NOT AT ALL
ACT_TOTALSCORE: 25
QUESTION_4 LAST FOUR WEEKS HOW OFTEN HAVE YOU USED YOUR RESCUE INHALER OR NEBULIZER MEDICATION (SUCH AS ALBUTEROL): NOT AT ALL
QUESTION_3 LAST FOUR WEEKS HOW OFTEN DID YOUR ASTHMA SYMPTOMS (WHEEZING, COUGHING, SHORTNESS OF BREATH, CHEST TIGHTNESS OR PAIN) WAKE YOU UP AT NIGHT OR EARLIER THAN USUAL IN THE MORNING: NOT AT ALL
QUESTION_1 LAST FOUR WEEKS HOW MUCH OF THE TIME DID YOUR ASTHMA KEEP YOU FROM GETTING AS MUCH DONE AT WORK, SCHOOL OR AT HOME: NONE OF THE TIME

## 2025-05-06 ASSESSMENT — PAIN SCALES - GENERAL: PAINLEVEL_OUTOF10: NO PAIN (0)

## 2025-05-06 NOTE — PROGRESS NOTES
{PROVIDER CHARTING PREFERENCE:063667}    Daniel Diaz is a 64 year old, presenting for the following health issues:  Musculoskeletal Problem      5/6/2025     1:02 PM   Additional Questions   Roomed by Veronika OSULLIVAN   Accompanied by n/a     Musculoskeletal Problem    History of Present Illness       Diabetes:   She presents for follow up of diabetes.    She is not checking blood glucose.         She has no concerns regarding her diabetes at this time.  She is having numbness in feet.  The patient has not had a diabetic eye exam in the last 12 months.          She eats 4 or more servings of fruits and vegetables daily.She consumes 0 sweetened beverage(s) daily.She exercises with enough effort to increase her heart rate 30 to 60 minutes per day.  She exercises with enough effort to increase her heart rate 6 days per week.   She is taking medications regularly.        She does not check her sugars at home at 10 units at HS daily and Jardiance 10 gm   Last hgb A1 c was done in December of 2023   Podiatrist right foot arch support is collapsed hurts to put weight , cannot walk without arch support new shoes or orthotics         Pain History:  When did you first notice your pain? One month ago  right flank pain , sharp pain in the right side on off , sometimes dull , heavy lifting in the garden   Have you seen anyone else for your pain? No  How has your pain affected your ability to work? Pain does not limit ability to work   Where in your body do you have pain?   Right side /Flank Pain  Onset/Duration: Ongoing for one month   Description:   Character: Sharp  Location: right lower quadrant right flank  Radiation: None and Back  Intensity: mild  Progression of Symptoms:  intermittent  Accompanying Signs & Symptoms:  Fever/Chills: No  Gas/Bloating: No  Nausea: No  Vomitting: No  Diarrhea: No  Constipation: No  Dysuria or Hematuria: No  History:   Trauma: No  Previous similar pain: No  Previous tests done:  none  Precipitating factors:   Does the pain change with:     Food: No    Bowel Movement: No    Urination: No   Other factors:  No  Therapies tried and outcome: None  No LMP recorded (lmp unknown). Patient has had a hysterectomy.    {Links to Pain Management SmartSet and MNPMP (Optional) :936909}    {ROS Picklists (Optional):506421}      Objective    LMP  (LMP Unknown)   There is no height or weight on file to calculate BMI.  Physical Exam   {Exam List (Optional):023740}    {Diagnostic Test Results (Optional):454611}        Signed Electronically by: Felicia Avalos MD  {Email feedback regarding this note to [email or web address]   :[id number]}   file to calculate BMI.  Physical Exam   GENERAL: alert and no distress  EYES: Eyes grossly normal to inspection, PERRL and conjunctivae and sclerae normal  NECK: no adenopathy, no asymmetry, masses, or scars  RESP: lungs clear to auscultation - no rales, rhonchi or wheezes  CV: regular rate and rhythm, normal S1 S2, no S3 or S4, no murmur, click or rub, no peripheral edema  ABDOMEN: soft, nontender, no hepatosplenomegaly, no masses and bowel sounds normal  MS: no gross musculoskeletal defects noted, no edema  NEURO: Normal strength and tone, mentation intact and speech normal  PSYCH: mentation appears normal, affect normal/bright    Results for orders placed or performed in visit on 05/06/25   Lipid panel reflex to direct LDL Non-fasting     Status: None   Result Value Ref Range    Cholesterol 168 <200 mg/dL    Triglycerides 91 <150 mg/dL    Direct Measure HDL 65 >=50 mg/dL    LDL Cholesterol Calculated 85 <100 mg/dL    Non HDL Cholesterol 103 <130 mg/dL    Patient Fasting > 8hrs? No     Narrative    Cholesterol  Desirable: < 200 mg/dL  Borderline High: 200 - 239 mg/dL  High: >= 240 mg/dL    Triglycerides  Normal: < 150 mg/dL  Borderline High: 150 - 199 mg/dL  High: 200-499 mg/dL  Very High: >= 500 mg/dL    Direct Measure HDL  Female: >= 50 mg/dL   Male: >= 40 mg/dL    LDL Cholesterol  Desirable: < 100 mg/dL  Above Desirable: 100 - 129 mg/dL   Borderline High: 130 - 159 mg/dL   High:  160 - 189 mg/dL   Very High: >= 190 mg/dL    Non HDL Cholesterol  Desirable: < 130 mg/dL  Above Desirable: 130 - 159 mg/dL  Borderline High: 160 - 189 mg/dL  High: 190 - 219 mg/dL  Very High: >= 220 mg/dL   Albumin Random Urine Quantitative with Creat Ratio     Status: Abnormal   Result Value Ref Range    Creatinine Urine mg/dL 46.5 mg/dL    Albumin Urine mg/L 12.4 mg/L    Albumin Urine mg/g Cr 26.67 (H) 0.00 - 25.00 mg/g Cr   Comprehensive metabolic panel (BMP + Alb, Alk Phos, ALT, AST, Total. Bili, TP)     Status: Abnormal   Result  Value Ref Range    Sodium 139 135 - 145 mmol/L    Potassium 4.0 3.4 - 5.3 mmol/L    Carbon Dioxide (CO2) 29 22 - 29 mmol/L    Anion Gap 7 7 - 15 mmol/L    Urea Nitrogen 16.8 8.0 - 23.0 mg/dL    Creatinine 0.89 0.51 - 0.95 mg/dL    GFR Estimate 72 >60 mL/min/1.73m2    Calcium 9.6 8.8 - 10.4 mg/dL    Chloride 103 98 - 107 mmol/L    Glucose 133 (H) 70 - 99 mg/dL    Alkaline Phosphatase 114 40 - 150 U/L    AST 23 0 - 45 U/L    ALT 22 0 - 50 U/L    Protein Total 7.0 6.4 - 8.3 g/dL    Albumin 4.3 3.5 - 5.2 g/dL    Bilirubin Total 0.2 <=1.2 mg/dL    Patient Fasting > 8hrs? No    Hemoglobin A1c     Status: Abnormal   Result Value Ref Range    Estimated Average Glucose 286 (H) <117 mg/dL    Hemoglobin A1C 11.6 (H) 0.0 - 5.6 %    Narrative    Results consistent with previous, repeat testing unnecessary            Signed Electronically by: Felicia Avalos MD

## 2025-05-07 ENCOUNTER — PATIENT OUTREACH (OUTPATIENT)
Dept: CARE COORDINATION | Facility: CLINIC | Age: 65
End: 2025-05-07
Payer: COMMERCIAL

## 2025-05-08 ENCOUNTER — RESULTS FOLLOW-UP (OUTPATIENT)
Dept: FAMILY MEDICINE | Facility: CLINIC | Age: 65
End: 2025-05-08

## 2025-06-05 DIAGNOSIS — E78.5 HYPERLIPIDEMIA LDL GOAL <100: ICD-10-CM

## 2025-06-05 RX ORDER — ATORVASTATIN CALCIUM 20 MG/1
20 TABLET, FILM COATED ORAL DAILY
Qty: 90 TABLET | Refills: 2 | Status: SHIPPED | OUTPATIENT
Start: 2025-06-05

## 2025-08-17 ENCOUNTER — HEALTH MAINTENANCE LETTER (OUTPATIENT)
Age: 65
End: 2025-08-17